# Patient Record
Sex: MALE | Race: WHITE | NOT HISPANIC OR LATINO | Employment: OTHER | URBAN - METROPOLITAN AREA
[De-identification: names, ages, dates, MRNs, and addresses within clinical notes are randomized per-mention and may not be internally consistent; named-entity substitution may affect disease eponyms.]

---

## 2017-03-31 ENCOUNTER — ALLSCRIPTS OFFICE VISIT (OUTPATIENT)
Dept: OTHER | Facility: OTHER | Age: 67
End: 2017-03-31

## 2017-03-31 DIAGNOSIS — Z11.59 ENCOUNTER FOR SCREENING FOR OTHER VIRAL DISEASES: ICD-10-CM

## 2017-03-31 DIAGNOSIS — I51.7 CARDIOMEGALY: ICD-10-CM

## 2017-03-31 DIAGNOSIS — I71.4 ABDOMINAL AORTIC ANEURYSM WITHOUT RUPTURE (HCC): ICD-10-CM

## 2017-03-31 DIAGNOSIS — Z12.5 ENCOUNTER FOR SCREENING FOR MALIGNANT NEOPLASM OF PROSTATE: ICD-10-CM

## 2017-03-31 DIAGNOSIS — I10 ESSENTIAL (PRIMARY) HYPERTENSION: ICD-10-CM

## 2017-03-31 DIAGNOSIS — Z13.6 ENCOUNTER FOR SCREENING FOR CARDIOVASCULAR DISORDERS: ICD-10-CM

## 2017-03-31 DIAGNOSIS — E04.1 NONTOXIC SINGLE THYROID NODULE: ICD-10-CM

## 2017-03-31 DIAGNOSIS — E78.2 MIXED HYPERLIPIDEMIA: ICD-10-CM

## 2017-06-04 ENCOUNTER — LAB CONVERSION - ENCOUNTER (OUTPATIENT)
Dept: OTHER | Facility: OTHER | Age: 67
End: 2017-06-04

## 2017-06-04 LAB
A/G RATIO (HISTORICAL): 1.3 (CALC) (ref 1–2.5)
ALBUMIN SERPL BCP-MCNC: 4.1 G/DL (ref 3.6–5.1)
ALP SERPL-CCNC: 52 U/L (ref 40–115)
ALT SERPL W P-5'-P-CCNC: 15 U/L (ref 9–46)
AST SERPL W P-5'-P-CCNC: 18 U/L (ref 10–35)
BASOPHILS # BLD AUTO: 0.5 %
BASOPHILS # BLD AUTO: 43 CELLS/UL (ref 0–200)
BILIRUB SERPL-MCNC: 0.6 MG/DL (ref 0.2–1.2)
BUN SERPL-MCNC: 18 MG/DL (ref 7–25)
BUN/CREA RATIO (HISTORICAL): NORMAL (CALC) (ref 6–22)
CALCIUM SERPL-MCNC: 9.2 MG/DL (ref 8.6–10.3)
CHLORIDE SERPL-SCNC: 103 MMOL/L (ref 98–110)
CHOLEST SERPL-MCNC: 148 MG/DL (ref 125–200)
CHOLEST/HDLC SERPL: 5.3 (CALC)
CO2 SERPL-SCNC: 28 MMOL/L (ref 20–31)
CREAT SERPL-MCNC: 1.11 MG/DL (ref 0.7–1.25)
DEPRECATED RDW RBC AUTO: 13.8 % (ref 11–15)
EGFR AFRICAN AMERICAN (HISTORICAL): 80 ML/MIN/1.73M2
EGFR-AMERICAN CALC (HISTORICAL): 69 ML/MIN/1.73M2
EOSINOPHIL # BLD AUTO: 292 CELLS/UL (ref 15–500)
EOSINOPHIL # BLD AUTO: 3.4 %
GAMMA GLOBULIN (HISTORICAL): 3.1 G/DL (CALC) (ref 1.9–3.7)
GLUCOSE (HISTORICAL): 82 MG/DL (ref 65–99)
HCT VFR BLD AUTO: 43.2 % (ref 38.5–50)
HDLC SERPL-MCNC: 28 MG/DL
HGB BLD-MCNC: 14.4 G/DL (ref 13.2–17.1)
LDL CHOLESTEROL (HISTORICAL): 95 MG/DL (CALC)
LYMPHOCYTES # BLD AUTO: 2339 CELLS/UL (ref 850–3900)
LYMPHOCYTES # BLD AUTO: 27.2 %
MCH RBC QN AUTO: 29.7 PG (ref 27–33)
MCHC RBC AUTO-ENTMCNC: 33.4 G/DL (ref 32–36)
MCV RBC AUTO: 88.8 FL (ref 80–100)
MONOCYTES # BLD AUTO: 765 CELLS/UL (ref 200–950)
MONOCYTES (HISTORICAL): 8.9 %
NEUTROPHILS # BLD AUTO: 5160 CELLS/UL (ref 1500–7800)
NEUTROPHILS # BLD AUTO: 60 %
NON-HDL-CHOL (CHOL-HDL) (HISTORICAL): 120 MG/DL (CALC)
PLATELET # BLD AUTO: 252 THOUSAND/UL (ref 140–400)
PMV BLD AUTO: 7.9 FL (ref 7.5–12.5)
POTASSIUM SERPL-SCNC: 3.8 MMOL/L (ref 3.5–5.3)
PROSTATE SPECIFIC ANTIGEN TOTAL (HISTORICAL): 0.9 NG/ML
RBC # BLD AUTO: 4.86 MILLION/UL (ref 4.2–5.8)
SODIUM SERPL-SCNC: 139 MMOL/L (ref 135–146)
TESTOSTERONE TOTAL (HISTORICAL): 322 NG/DL (ref 250–827)
TOTAL PROTEIN (HISTORICAL): 7.2 G/DL (ref 6.1–8.1)
TRIGL SERPL-MCNC: 126 MG/DL
TSH SERPL DL<=0.05 MIU/L-ACNC: 1.38 MIU/L (ref 0.4–4.5)
WBC # BLD AUTO: 8.6 THOUSAND/UL (ref 3.8–10.8)

## 2017-06-05 ENCOUNTER — GENERIC CONVERSION - ENCOUNTER (OUTPATIENT)
Dept: OTHER | Facility: OTHER | Age: 67
End: 2017-06-05

## 2017-07-28 ENCOUNTER — ALLSCRIPTS OFFICE VISIT (OUTPATIENT)
Dept: OTHER | Facility: OTHER | Age: 67
End: 2017-07-28

## 2017-07-28 DIAGNOSIS — Z00.00 ENCOUNTER FOR GENERAL ADULT MEDICAL EXAMINATION WITHOUT ABNORMAL FINDINGS: ICD-10-CM

## 2017-07-28 DIAGNOSIS — I10 ESSENTIAL (PRIMARY) HYPERTENSION: ICD-10-CM

## 2017-07-28 DIAGNOSIS — E78.2 MIXED HYPERLIPIDEMIA: ICD-10-CM

## 2017-07-28 LAB — OCCULT BLD, FECAL IMMUNOLOGICAL (HISTORICAL): NEGATIVE

## 2017-09-25 ENCOUNTER — GENERIC CONVERSION - ENCOUNTER (OUTPATIENT)
Dept: OTHER | Facility: OTHER | Age: 67
End: 2017-09-25

## 2017-09-25 LAB
HEPATITIS C ANTIBODY (HISTORICAL): NORMAL
SIGNAL TO CUT-OFF (HISTORICAL): 0.14

## 2017-10-13 ENCOUNTER — HOSPITAL ENCOUNTER (OUTPATIENT)
Dept: CT IMAGING | Facility: HOSPITAL | Age: 67
Discharge: HOME | End: 2017-10-13
Payer: COMMERCIAL

## 2017-10-13 DIAGNOSIS — I10 ESSENTIAL (PRIMARY) HYPERTENSION: ICD-10-CM

## 2017-10-13 DIAGNOSIS — E78.2 MIXED HYPERLIPIDEMIA: ICD-10-CM

## 2017-10-13 DIAGNOSIS — Z00.00 ENCOUNTER FOR GENERAL ADULT MEDICAL EXAMINATION WITHOUT ABNORMAL FINDINGS: ICD-10-CM

## 2017-10-26 ENCOUNTER — GENERIC CONVERSION - ENCOUNTER (OUTPATIENT)
Dept: OTHER | Facility: OTHER | Age: 67
End: 2017-10-26

## 2017-12-08 LAB
A/G RATIO (HISTORICAL): 1.5 (CALC) (ref 1–2.5)
ALBUMIN SERPL BCP-MCNC: 4 G/DL (ref 3.6–5.1)
ALP SERPL-CCNC: 47 U/L (ref 40–115)
ALT SERPL W P-5'-P-CCNC: 18 U/L (ref 9–46)
AST SERPL W P-5'-P-CCNC: 22 U/L (ref 10–35)
BILIRUB SERPL-MCNC: 0.6 MG/DL (ref 0.2–1.2)
BUN SERPL-MCNC: 18 MG/DL (ref 7–25)
BUN/CREA RATIO (HISTORICAL): NORMAL (CALC) (ref 6–22)
CALCIUM SERPL-MCNC: 9.1 MG/DL (ref 8.6–10.3)
CHLORIDE SERPL-SCNC: 106 MMOL/L (ref 98–110)
CHOLEST SERPL-MCNC: 159 MG/DL
CHOLEST/HDLC SERPL: 5 (CALC)
CO2 SERPL-SCNC: 29 MMOL/L (ref 20–31)
CREAT SERPL-MCNC: 1.13 MG/DL (ref 0.7–1.25)
EGFR AFRICAN AMERICAN (HISTORICAL): 79 ML/MIN/1.73M2
EGFR-AMERICAN CALC (HISTORICAL): 68 ML/MIN/1.73M2
GAMMA GLOBULIN (HISTORICAL): 2.6 G/DL (CALC) (ref 1.9–3.7)
GLUCOSE (HISTORICAL): 79 MG/DL (ref 65–99)
HDLC SERPL-MCNC: 32 MG/DL
LDL CHOLESTEROL (HISTORICAL): 107 MG/DL (CALC)
NON-HDL-CHOL (CHOL-HDL) (HISTORICAL): 127 MG/DL (CALC)
POTASSIUM SERPL-SCNC: 3.8 MMOL/L (ref 3.5–5.3)
SODIUM SERPL-SCNC: 142 MMOL/L (ref 135–146)
TOTAL PROTEIN (HISTORICAL): 6.6 G/DL (ref 6.1–8.1)
TRIGL SERPL-MCNC: 104 MG/DL

## 2017-12-09 ENCOUNTER — GENERIC CONVERSION - ENCOUNTER (OUTPATIENT)
Dept: OTHER | Facility: OTHER | Age: 67
End: 2017-12-09

## 2017-12-12 ENCOUNTER — ALLSCRIPTS OFFICE VISIT (OUTPATIENT)
Dept: OTHER | Facility: OTHER | Age: 67
End: 2017-12-12

## 2017-12-13 NOTE — PROGRESS NOTES
Assessment  Assessed    1  Aneurysm, ascending aorta (441 2) (I71 2)   2  Benign essential hypertension (401 1) (I10)   3  LAFB (left anterior fascicular block) (426 2) (I44 4)   4  Left ventricular hypertrophy (429 3) (I51 7)   5  Mixed hyperlipidemia (272 2) (E78 2)   6  Family history of myocardial infarction (V17 3) (Z82 49) : Mother    Plan  Aneurysm, ascending aorta    · Aspirin EC 81 MG Oral Tablet Delayed Release; take 1 tablet by mouth every day   Rx By: Cassidy Driver; Dispense: 90 Days ; #:90 Tablet Delayed Release; Refill: 3;Aneurysm, ascending aorta; KATLYN = N; Sent To: [facility]/PHARMACY #[id number]  · Metoprolol Succinate ER 25 MG Oral Tablet Extended Release 24 Hour; Take 1tablet daily   Rx By: Cassidy Driver; Dispense: 0 Days ; #:30 Tablet Extended Release 24 Hour; Refill: 3;For: Aneurysm, ascending aorta; KATLYN = N; Sent To: [facility]/PHARMACY #6851  · Pravastatin Sodium 10 MG Oral Tablet; TAKE 1 TABLET DAILY AS DIRECTED   Rx By: Cassidy Driver; Dispense: 90 Days ; #:90 Tablet; Refill: 3;For: Aneurysm, ascending aorta; KATLYN = N; Sent To: [facility]/PHARMACY #[id number]  · (1) COMPREHENSIVE METABOLIC PANEL; Status:Active; Requested for:12Apr2018; Perform:[facility]; Due:12Apr2019;Ordered; For:Aneurysm, ascending aorta; Ordered By:Vidya Rowley;   · (1) LIPID PANEL, FASTING; Status:Active; Requested for:12Apr2018; Perform:[facility]; Due:12Apr2019;Ordered;ascending aorta; Ordered By:Vidya Rowley;   · CTA CHEST WO W CONTRAST; Status:Need Information - Financial Authorization; Requested for:12Apr2018; Perform:[facility]; DJQ:[id number];IUIVSIR;HHBUPLCPE aorta; Ordered By:Vidya Rowley;   · Follow-up visit in 4 Months Evaluation and Treatment  Follow-up  Status: Hold For -Scheduling  Requested for: 31Jic5535   Ordered; Aneurysm, ascending aorta; Ordered By: Cassidy Driver Performed:  Due: 22KUQ7361   · A diet that is low in fat, cholesterol, and sodium is considered a cardiac diet ;Status:Complete;   Done: 12RQU5075 06:06PM   Ordered;ascending aorta; Ordered By:Vidya Rowley;   · Begin or continue regular aerobic exercise  Gradually work up to at least {count1}sessions of {dur1} of exercise a week ; Status:Complete;   Done: 66HFF1869 06:06PM   Ordered; For:Aneurysm, ascending aorta; Ordered By:Vidya Rowley;  LAFB (left anterior fascicular block)    · EKG/ECG- POC; Status:Complete;   Done: 29LMG8883 05:41PM   Perform: In Office; Last Updated Rigo Trimble; 12/12/2017 5:42:05 PM;Ordered;(left anterior fascicular block); Ordered By:Vidya Rowley; Discussion/Summary  Cardiology Discussion Summary Free Text Note Form [facility]:   Fusiform Ascending Aortic Aneurysm: measured at 4 4cm on Coronary CT in Oct 2017  Would continue to follow this for now, repeat testing in 6 months 4/2018 to assess for growth  Based on the fact that he has this, along with the LM CCS of 40, he would benefit from being on a baby ASA, as well as a B-blocker, and a statin to reduce cardiac risk and optimize medical therapy  not optimally controlled on current regimen, so I would add a B-blocker, which can reduce the shearing forces on his aneurysm  as noted above, would initiate a statin  Goal LDL would be <100, but in addition, we would use the statin for properties outside of cholesterol lowering, namely reduction of inflammation  Counseling Documentation With Imm: The patient was counseled regarding diagnostic results,-- instructions for management,-- risk factor reductions,-- impressions  total time of encounter was 40 minutes-- and-- 21 minutes was spent counseling  Chief Complaint  Chief Complaint Free Text Note Form: F/U to testing pt req      History of Present Illness  Cardiology (Follow-Up):  The patient states he has been generally Patient is here for evaluation and management of coronary CT that revealed a CCS of 41 in LM and a fusiform aortic aneurysm of 4 4cm , but doing well since the last visit  Comorbid Illnesses: hypertension,-- hyperlipidemia-- and-- Fusiform ascending aortic aneurysm 4 4cm in Oct 2017; LVH; LAHB  Interval Events: feels well, no complaints  Symptoms: denies chest pain at rest,-- denies exertional chest pain,-- denies dyspnea,-- denies fatigue,-- denies exercise intolerance,-- denies palpitations,-- denies edema,-- denies orthopnea,-- denies claudication,-- denies dizziness-- and-- denies orthostatic dizziness  Associated symptoms: no syncope,-- no PND,-- no tendency for easy bleeding,-- no tendency for easy bruising,-- no TIA symptoms-- and-- no recent weight gain  Disease Monitoring:  Medications: the patient is adherent with his medication regimen  -- He denies medication side effects  Review of Systems  Cardiology Male ROS:    Cardiac: No complaints of chest pain, no palpitations, no fainiting  Skin: No complaints of nonhealing sores or skin rash  Genitourinary: No complaints of recurrent urinary tract infections, frequent urination at night, difficult urination, blood in urine, kidney stones, loss of bladder control, no kidney or prostate problems, no erectile dysfunction  Psychological: No complaints of feeling depressed, anxiety, panic attacks, or difficulty concentrating  General: No complaints of trouble sleeping, lack of energy, fatigue, appetite changes, weight changes, fever, frequent infections, or night sweats  Respiratory: No complaints of shortness of breath, cough with sputum, or wheezing  HEENT: No complaints of serious problems, hearing problems, nose problems, throat problems, or snoring  Gastrointestinal: No complaints of liver problems, nausea, vomiting, heartburn, constipation, bloody stools, diarrhea, problems swallowing, adbominal pain, or rectal bleeding  Hematologic: No complaints of bleeding disorders, anemia, blood clots, or excessive brusing    Neurological: No complaints of numbness, tingling, dizziness, weakness, seizures, headaches, syncope or fainting, AM fatigue, daytime sleepiness, no witnessed apnea episodes  Musculoskeletal: arthritis-- and-- swelling/pain, but-- no back pain   ROS Reviewed:   ROS reviewed  Active Problems  Problems    1  Aneurysm of abdominal aorta (441 4) (I71 4)   2  Aneurysm, ascending aorta (441 2) (I71 2)   3  Benign essential hypertension (401 1) (I10)   4  Encounter for screening for cardiovascular disorders (V81 2) (Z13 6)   5  Encounter for screening for malignant neoplasm of colon (V76 51) (Z12 11)   6  Encounter for screening for malignant neoplasm of prostate (V76 44) (Z12 5)   7  Erectile dysfunction, unspecified erectile dysfunction type (607 84) (N52 9)   8  Hemorrhoids (455 6) (K64 9)   9  LAFB (left anterior fascicular block) (426 2) (I44 4)   10  Left ventricular hypertrophy (429 3) (I51 7)   11  Mixed hyperlipidemia (272 2) (E78 2)   12  Need for hepatitis C screening test (V73 89) (Z11 59)   13  Need for vaccination (V05 9) (Z23)   14  Screening for genitourinary condition (V81 6) (Z13 89)   15  Thyroid nodule (241 0) (E04 1)    Past Medical History  Problems    1  History of Acute upper respiratory infection (465 9) (J06 9)   2  History of Acute upper respiratory infection (465 9) (J06 9)   3  History of Elbow injury (959 3) (S59 909A)   4  History of Fecal occult blood test positive (792 1) (R19 5)   5  History of abdominal aortic aneurysm (V12 59) (Z86 79)   6  History of acute sinusitis (V12 69) (Z87 09)   7  History of allergic rhinitis (V12 69) (Z87 09)   8  History of allergic rhinitis (V12 69) (Z87 09)   9  History of hypertension (V12 59) (Z86 79)   10  History of mixed hyperlipidemia (V12 29) (Z86 39)   11  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)   12  History of Olecranon bursitis of right elbow (726 33) (M70 21)  Active Problems And Past Medical History Reviewed: The active problems and past medical history were reviewed and updated today        Surgical History  Problems    1  History of Knee Arthroscopy (Therapeutic)  Surgical History Reviewed: The surgical history was reviewed and updated today  Family History  Mother    1  Family history of myocardial infarction (V17 3) (Z82 49)   2  Family history of sudden cardiac death (SCD) (V17 41) (Z82 41)  Father    3  Family history of cerebrovascular accident (V17 1) (Z82 3)  Family History    4  Family history of Acute Myocardial Infarction (V17 3)   5  Family history of Aneurysm Of Abdominal Aorta   6  Family history of Cancer   7  Family history of Colon Cancer (V16 0)   8  Family history of Gastric Cancer (V16 0)   9  Family history of Liver Cancer   10  Family history of Lung Cancer (V16 1)   11  Family history of Prostate Cancer (V16 42)   12  Family history of Stroke Syndrome (V17 1)  Family History Reviewed: The family history was reviewed and updated today  Social History  Problems    · History of Current some day smoker (305 1) (F17 200)   · Former smoker (I92 99) (A06 903)   ·   Social History Reviewed: The social history was reviewed and updated today  The social history was reviewed and is unchanged  Current Meds   1  Amlodipine Besy-Benazepril HCl - 5-20 MG Oral Capsule; take 1 capsule daily; Therapy: 58MYG6295 to (Last Rx:13Nov2017)  Requested for: 54HUU2243 Ordered   2  Cetirizine HCl TABS; TAKE 1 TABLET DAILY; Therapy: (Recorded:25Idg5698) to Recorded   3  Cialis 20 MG Oral Tablet; 1 tab PO PRN  not to repeat in 72 hrs medically necessary; Therapy: 26Apr2016 to (Evaluate:03Mzs3636); Last Rx:26Apr2016 Ordered   4  Co Q 10 CAPS; TAKE 1 CAPSULE TWICE DAILY; Therapy: (Recorded:40Xbp4450) to Recorded   5  HydroCHLOROthiazide 12 5 MG Oral Tablet; Take 1 tablet daily; Therapy: 84Sqs3831 to (Last Rx:52Rpk3229)  Requested for: 66YJZ4443 Ordered   6  Omega-3-acid Ethyl Esters 1 GM Oral Capsule; take 1 capsule twice a day;  Therapy: 07ESY8964 to (Last Rx:06Jan2016)  Requested for: 57CUN0479 Ordered   7  Red Yeast Rice CAPS; Therapy: (Recorded:58Wkq0973) to Recorded  Medication List Reviewed: The medication list was reviewed and updated today  Allergies  Medication    1  No Known Drug Allergies    Vitals  Vital Signs    Recorded: 66Iyz1855 05:42PM   Heart Rate 68   Respiration 16   Systolic 872, RUE, Sitting   Diastolic 92, RUE, Sitting   BP CUFF SIZE Large   Height 6 ft 7 in   Weight 232 lb 6 oz   BMI Calculated 26 18   BSA Calculated 2 43       Physical Exam   Constitutional - General appearance: No acute distress, well appearing and well nourished  Eyes - Conjunctiva and Sclera examination: Conjunctiva pink, sclera anicteric  Neck - Normal, no JVD   Pulmonary - Respiratory effort: No signs of respiratory distress  -- Auscultation of lungs: Clear to auscultation  Cardiovascular - Auscultation of heart: Normal rate and rhythm, normal S1 and S2, no murmurs  -- Pedal pulses: Normal, 2+ bilaterally  -- Examination of extremities for edema and/or varicosities: Normal    Abdomen - Soft  Musculoskeletal - Gait and station: Normal gait  Skin - Skin: Normal without rashes  Skin is warm and well perfused  Neurologic - Speech normal  No focal deficits  Psychiatric - Orientation to person, place, and time: Normal       Results/Data  ECG Report:  Rhythm and rate:  normal sinus rhythm  P-waves:   the P wave is normal -- NM interval is normal   QRS: left anterior hemiblock  ST segment: the ST segments are normal   T waves:   normal  (1) LIPID PANEL, FASTING 01CDT4603 08:43AM Joslyn Deleon     Test Name Result Flag Reference   CHOLESTEROL, TOTAL 159 mg/dL  <200   HDL CHOLESTEROL 32 mg/dL L >42   TRIGLICERIDES 853 mg/dL  <150   LDL-CHOLESTEROL 107 mg/dL (calc) H      Reference range: <100   Desirable range <100 mg/dL for patients with CHD or diabetes and <70 mg/dL for diabetic patients with known heart disease     LDL-C is now calculated using the Luis-Ruano  calculation, which is a validated novel method providing  better accuracy than the Friedewald equation in the  estimation of LDL-C  Reese Capps  Rosita Salguero  7132;826(23): 4962-5591  (http://Sun-eee/faq/AMR762)   CHOL/HDLC RATIO 5 0 (calc) H <5 0   NON HDL CHOLESTEROL 127 mg/dL (calc)  <130     For patients with diabetes plus 1 major ASCVD risk  factor, treating to a non-HDL-C goal of <100 mg/dL  (LDL-C of <70 mg/dL) is considered a therapeutic  option  CT CORONARY CALCIUM SCORE 14WJT9851 09:55AM Sonia Sin Order Number: [id number]   - Patient Instructions: To schedule this appointment, please contact Central Scheduling at [phone]  Test Name Result Flag Reference   CT CORONARY CALCIUM SCORE (Report)       CT CORONARY ARTERY CALCIUM SCREENING WITHOUT INTRAVENOUS CONTRAST   INDICATION: Z00 00: Encounter for general adult medical examination without abnormal findings  I10: Essential (primary) hypertension  E78 2: Mixed hyperlipidemia (this clinical history is taken directly from the electronic ordering system)  TECHNIQUE: Low radiation dose computed tomography of the heart was performed with EKG gating, suspended respiration, and without intravenous contrast  This examination, like all CT scans performed in the [facility], was performed   utilizing techniques to minimize radiation dose exposure, including the use of iterative reconstruction and automated exposure control  Postprocessing was performed on a 3-D computer workstation to measure the amount of calcium in the coronary arteries  Imaging was limited to the heart and the immediately adjacent lungs     FINDINGS:    Coronary artery calcium score breakdown is as follows (note: calcified atherosclerotic plaque in the posterior descending artery is included in right coronary artery score for right dominant circulation and left circumflex score for left dominant   circulation):   Left main coronary artery: 41  Left anterior descending coronary artery and diagonal branches: 0  Left circumflex coronary artery and left marginal branches: 0  Right coronary artery and right marginal branches: 0   TOTAL coronary calcium score: 41  Calcium score PERCENTILE of age, race, and gender matched database participants in the Multi-Ethnic Study of Atherosclerosis (DELUNA) trial:  42nd    There is fusiform ascending thoracic aortic aneurysm measuring up to 44 mm in caliber, tapering to 37 mm at the level of the proximal aortic arch  There is fatty density within the anterior myocardial wall of the right ventricle seen on image 89 of   series 2  This finding can be seen in patients who are asymptomatic but can also be seen in patients with arrythmogenic right ventricular cardiomyopathy  Emphysematous changes are noted in the lungs  IMPRESSION:   Total cardiac score equals 41  For more useful information regarding the prognostic significance of the calcium score, please consult the calculator at the website https://www [employer]/  aspx  Of note is that all of the calcific   atherosclerotic plaque identified on this examination is located in the left main coronary artery  Fusiform ascending thoracic aortic aneurysm, 44 mm in greatest caliber  Fatty density within the anterior myocardial wall of the right ventricle; this finding can be seen in patients who are asymptomatic but can also be seen in patients with arrythmogenic right ventricular cardiomyopathy       ##sigslh##sigslh       Workstation performed: [id number]   Signed by:  Chauncey Cee MD  10/13/17       Signatures   Electronically signed by : JANA White ; Dec 12 2017  6:27PM EST                       (Author)

## 2018-01-09 NOTE — RESULT NOTES
Discussion/Summary   hep c screening negative     Verified Results  (Q) HEPATITIS C ANTIBODY 64Gjk7596 10:32AM Germaine Shaver   REPORT COMMENT:  FASTING:NO     Test Name Result Flag Reference   HEPATITIS C ANTIBODY NON-REACTIVE  NON-REACTIVE   SIGNAL TO CUT-OFF 0 14  <1 00

## 2018-01-10 NOTE — PROGRESS NOTES
Assessment    1  Encounter for preventive health examination (V70 0) (Z00 00)   2  Benign essential hypertension (401 1) (I10)   3  Mixed hyperlipidemia (272 2) (E78 2)   4  Erectile dysfunction, unspecified erectile dysfunction type (607 84) (N52 9)    Plan  Benign essential hypertension    · Amlodipine Besy-Benazepril HCl - 5-20 MG Oral Capsule (Lotrel); take 1  capsule daily  Benign essential hypertension, Left ventricular hypertrophy    · Hydrochlorothiazide 12 5 MG Oral Tablet; Take 1 tablet daily  Benign essential hypertension, Mixed hyperlipidemia    · Follow-up visit in 6 months Evaluation and Treatment  Follow-up  Status: Hold For -  Scheduling  Requested for: 26Apr2016   · (1) CBC/PLT/DIFF; Status:Active; Requested for:26Oct2016;    · (1) COMPREHENSIVE METABOLIC PANEL; Status:Active; Requested CDY:[id number];    · (1) LIPID PANEL, FASTING; Status:Active; Requested for:26Oct2016;    · (1) VITAMIN D 125-DIHYDROXY (CALCITRIOL); Status:Active; Requested  for:26Oct2016;    · (1) VITAMIN D 25-HYDROXY; Status:Active; Requested ZYE:[id number];   Erectile dysfunction, unspecified erectile dysfunction type    · Cialis 20 MG Oral Tablet; 1 tab PO PRN  not to repeat in 72 hrs medically  necessary  Health Maintenance    · DIGITAL RECTAL EXAM; Status:Complete;   Done: 26Apr2016 04:43PM   · Hemoccult Screening - POC; Status:Complete;   Done: 10WBK8216 04:43PM   · Call [phone] if: You have any warning signs of skin cancer ; Status:Complete;    Done: 78VYS3501 04:58PM   · Call 911 if:  You experience a new kind of chest pain (angina) or pressure ;  Status:Complete;   Done: 80KEU8654 04:58PM   · Begin a limited exercise program ; Status:Complete;   Done: 26Apr2016 04:58PM   · Decreasing the stress in your life may help your condition improve ; Status:Complete;    Done: 26Apr2016 04:58PM   · Diets that are low in carbohydrates and high in protein are very popular for weight loss ;  Status:Complete;   Done: 70DKZ0111 04: 58PM   · Regular aerobic exercise can help reduce stress ; Status:Complete;   Done: 35HHB6406  04:58PM   · The plan of care for falls is detailed in the plan and/or discussion section of today's note ;  Status:Complete;   Done: 26Apr2016 04:58PM   · The plan of care for urinary incontinence is detailed in the plan and/or discussion section  of today's note ; Status:Complete;   Done: 26Apr2016 04:58PM   · There are many exercise options for seniors ; Status:Complete;   Done: 26Apr2016 04:58PM   · There ways to avoid falling ; Status:Complete;   Done: 41Rfb2552 04:58PM   · These are things you can do to prevent falls in and around the home ; Status:Complete;    Done: 26Apr2016 04:58PM  Mixed hyperlipidemia    · Omega-3-acid Ethyl Esters 1 GM Oral Capsule (Lovaza); take 1 capsule twice  a day  Unlinked    · Red Yeast Rice CAPS    Chief Complaint  complete physical exam  Patient was just to the eye dr on Saturday, subsequently vision screen was not completed today  acgRMA      History of Present Illness  HM, Adult Male: The patient is being seen for a health maintenance evaluation  General Health:   Screening:   HPI: as above  Pt is here for a full physical    pt states he feels well  states the thinks he may have little upper respiratory and cough type things    pt states he finds he is having issues with errections    pt would like vit d with his next labs             Review of Systems    Constitutional: No fever or chills, feels well, no tiredness, no recent weight gain or weight loss  Eyes: No complaints of eye pain, no red eyes, no discharge from eyes, no itchy eyes  ENT: no complaints of earache, no hearing loss, no nosebleeds, no nasal discharge, no sore throat, no hoarseness  Cardiovascular: No complaints of slow heart rate, no fast heart rate, no chest pain, no palpitations, no leg claudication, no lower extremity     Respiratory: No complaints of shortness of breath, no wheezing, no cough, no SOB on exertion, no orthopnea or PND  Gastrointestinal: No complaints of abdominal pain, no constipation, no nausea or vomiting, no diarrhea or bloody stools  Genitourinary: No complaints of dysuria, no incontinence, no hesitancy, no nocturia, no genital lesion, no testicular pain  Musculoskeletal: No complaints of arthralgia, no myalgias, no joint swelling or stiffness, no limb pain or swelling  Integumentary: No complaints of skin rash or skin lesions, no itching, no skin wound, no dry skin  Neurological: No compliants of headache, no confusion, no convulsions, no numbness or tingling, no dizziness or fainting, no limb weakness, no difficulty walking  Psychiatric: Is not suicidal, no sleep disturbances, no anxiety or depression, no change in personality, no emotional problems  Endocrine: No complaints of proptosis, no hot flashes, no muscle weakness, no erectile dysfunction, no deepening of the voice, no feelings of weakness  Hematologic/Lymphatic: No complaints of swollen glands, no swollen glands in the neck, does not bleed easily, no easy bruising  Active Problems    1  Aneurysm of abdominal aorta (441 4) (I71 4)   2  Benign essential hypertension (401 1) (I10)   3  Encounter for screening for malignant neoplasm of colon (V76 51) (Z12 11)   4  Hemorrhoids (455 6) (K64 9)   5  LAFB (left anterior fascicular block) (426 2) (I44 4)   6  Left ventricular hypertrophy (429 3) (I51 7)   7  Mixed hyperlipidemia (272 2) (E78 2)   8  Need for vaccination (V05 9) (Z23)   9   Thyroid disorder (246 9) (E07 9)    Past Medical History    · History of Acute upper respiratory infection (465 9) (J06 9)   · History of Acute upper respiratory infection (465 9) (J06 9)   · History of Elbow injury (959 3) (S50 579A)   · History of Fecal occult blood test positive (792 1) (R19 5)   · History of abdominal aortic aneurysm (V12 59) (Z86 79)   · History of acute sinusitis (V12 69) (Z87 09)   · History of allergic rhinitis (V12 69) (Z87 09)   · History of allergic rhinitis (V12 69) (Z87 09)   · History of hypertension (V12 59) (Z86 79)   · History of mixed hyperlipidemia (V12 29) (Z86 39)   · 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)   · History of Olecranon bursitis of right elbow (726 33) (M70 21)    Surgical History    · History of Knee Arthroscopy    Family History  Mother    · Family history of myocardial infarction (V17 3) (Z82 49)   · Family history of sudden cardiac death (SCD) (V17 36) (Z80 37)  Father    · Family history of cerebrovascular accident (V17 1) (Z82 3)  Family History    · Family history of Acute Myocardial Infarction (V17 3)   · Family history of Aneurysm Of Abdominal Aorta   · Family history of Cancer   · Family history of Colon Cancer (V16 0)   · Family history of Gastric Cancer (V16 0)   · Family history of Liver Cancer   · Family history of Lung Cancer (V16 1)   · Family history of Prostate Cancer (V16 42)   · Family history of Stroke Syndrome (V17 1)    Social History    · History of Current some day smoker (305 1) (F17 210)   · Former smoker (V98 38) (Z87 665)   ·     Current Meds   1  Amlodipine Besy-Benazepril HCl - 5-20 MG Oral Capsule; take 1 capsule daily; Therapy: 30HBY8059 to (Last YB:62BBN3315)  Requested for: 44LWF6516 Ordered   2  Cetirizine HCl TABS; Therapy: (Recorded:88Gfa4703) to Recorded   3  Co Q 10 CAPS; Therapy: (Recorded:33Gwv8066) to Recorded   4  Hydrochlorothiazide 12 5 MG Oral Tablet; Take 1 tablet daily; Therapy: 84Obb3077 to (Last Rx:22Apr2016)  Requested for: 09Oev5793 Ordered   5  Omega-3-acid Ethyl Esters 1 GM Oral Capsule; take 1 capsule twice a day; Therapy: 77XJR9524 to (Last Rx:06Jan2016)  Requested for: 11VDO7005 Ordered   6  Red Yeast Rice CAPS; Therapy: (Recorded:51Zti0995) to Recorded    Allergies    1   No Known Drug Allergies    Vitals   Recorded: 26Apr2016 04:22PM   Temperature 97 6 F    Heart Rate 72    Respiration 18    Blood Pressure 76 mm Hg 112 mm Hg   Height 6 ft 7 in    Weight 225 lb     BMI Calculated 25 35 kg/m2    BSA Calculated 2 4 m2      Physical Exam    Constitutional   General appearance: No acute distress, well appearing and well nourished  Eyes   Conjunctiva and lids: No erythema, swelling or discharge  Pupils and irises: Equal, round, reactive to light  Ophthalmoscopic examination: Normal fundi and optic discs  Ears, Nose, Mouth, and Throat   External inspection of ears and nose: Normal     Otoscopic examination: Tympanic membranes translucent with normal light reflex  Canals patent without erythema  Hearing: Normal     Nasal mucosa, septum, and turbinates: Normal without edema or erythema  Lips, teeth, and gums: Normal, good dentition  Oropharynx: Normal with no erythema, edema, exudate or lesions  Neck   Neck: Supple, symmetric, trachea midline, no masses  Thyroid: Normal, no thyromegaly  Pulmonary   Respiratory effort: No increased work of breathing or signs of respiratory distress  Percussion of chest: Normal     Palpation of chest: Normal     Auscultation of lungs: Clear to auscultation  Cardiovascular   Palpation of heart: Normal PMI, no thrills  Auscultation of heart: Normal rate and rhythm, normal S1 and S2, no murmurs  Carotid pulses: 2+ bilaterally  Abdominal aorta: Normal     Femoral pulses: 2+ bilaterally  Pedal pulses: 2+ bilaterally  Examination of extremities for edema and/or varicosities: Normal     Chest   Breasts: Normal, no dimpling or skin changes appreciated  Palpation of breasts and axillae: Normal, no masses palpated  Chest: Normal     Abdomen   Abdomen: Non-tender, no masses  Liver and spleen: No hepatomegaly or splenomegaly  Examination for hernias: No hernias appreciated  Anus, perineum, and rectum: Normal sphincter tone, no masses, no prolapse  Stool sample for occult blood: Negative      Genitourinary   Scrotal contents: Normal testes, no masses  Penis: Normal, no lesions  Digital rectal exam of prostate: Normal size, no masses  Lymphatic   Palpation of lymph nodes in neck: No lymphadenopathy  Palpation of lymph nodes in axillae: No lymphadenopathy  Palpation of lymph nodes in groin: No lymphadenopathy  Palpation of lymph nodes in other areas: No lymphadenopathy  Musculoskeletal   Gait and station: Normal     Inspection/palpation of digits and nails: Normal without clubbing or cyanosis  Inspection/palpation of joints, bones, and muscles: Normal     Range of motion: Normal     Stability: Normal     Muscle strength/tone: Normal     Skin   Skin and subcutaneous tissue: Normal without rashes or lesions  Palpation of skin and subcutaneous tissue: Normal turgor  Neurologic   Cranial nerves: Cranial nerves 2-12 intact  Reflexes: 2+ and symmetric  Sensation: No sensory loss  Psychiatric   Judgment and insight: Normal     Orientation to person, place and time: Normal     Recent and remote memory: Intact      Mood and affect: Normal        Results/Data  Hemoccult Screening - POC 26Apr2016 04:43PM Clarine Cue     Test Name Result Flag Reference   Hemoccult Negative       DIGITAL RECTAL EXAM [id number] 04:43PM [facility] Cue     Test Name Result Flag Reference   DIGITAL RECTAL EXAM [id number]       (Q) CBC (INCLUDES DIFF/PLT) (REFL) [id number] 09:30AM Clarine Cue     Test Name Result Flag Reference   WHITE BLOOD CELL COUNT 7 4 Thousand/uL  3 8-10 8   RED BLOOD CELL COUNT 4 88 Million/uL  4 20-5 80   HEMOGLOBIN 14 5 g/dL  13 2-17 1   HEMATOCRIT 43 6 %  38 5-50 0   MCV 89 3 fL  80 0-100 0   MCH 29 6 pg  27 0-33 0   MCHC 33 2 g/dL  32 0-36 0   RDW 13 7 %  11 0-15 0   PLATELET COUNT 103 Thousand/uL  140-400   MPV 8 4 fL  7 5-11 5   ABSOLUTE NEUTROPHILS 4366 cells/uL  5419-5716   ABSOLUTE LYMPHOCYTES 2020 cells/uL  850-3900   ABSOLUTE MONOCYTES 622 cells/uL  200-950   ABSOLUTE EOSINOPHILS 348 cells/uL   ABSOLUTE BASOPHILS 44 cells/uL  0-200   NEUTROPHILS 59 0 %     LYMPHOCYTES 27 3 %     MONOCYTES 8 4 %     EOSINOPHILS 4 7 %     BASOPHILS 0 6 %       (Q) COMPREHENSIVE METABOLIC PNL W/ADJUSTED CALCIUM [id number] 09:30AM [facility]     Test Name Result Flag Reference   GLUCOSE 77 mg/dL  65-99   Fasting reference interval   UREA NITROGEN (BUN) 18 mg/dL  7-25   CREATININE 1 09 mg/dL  0 70-1 25   For patients >52years of age, the reference limit  for Creatinine is approximately 13% higher for people  identified as -American  eGFR NON-AFR  AMERICAN 72 mL/min/1 73m2  > OR = 60   eGFR AFRICAN AMERICAN 83 mL/min/1 73m2  > OR = 60   BUN/CREATININE RATIO   2-75   NOT APPLICABLE (calc)   SODIUM 141 mmol/L  135-146   POTASSIUM 4 0 mmol/L  3 5-5 3   CHLORIDE 104 mmol/L     CARBON DIOXIDE 25 mmol/L  19-30   CALCIUM 9 3 mg/dL  8 6-10 3   CALCIUM (ADJUSTED FOR$ALBUMIN) 9 4 mg/dL (calc)  8 6-10 2   PROTEIN, TOTAL 7 1 g/dL  6 1-8 1   ALBUMIN 4 2 g/dL  3 6-5 1   GLOBULIN 2 9 g/dL (calc)  1 9-3 7   ALBUMIN/GLOBULIN RATIO 1 4 (calc)  1 0-2 5   BILIRUBIN, TOTAL 0 6 mg/dL  0 2-1 2   ALKALINE PHOSPHATASE 59 U/L     AST 19 U/L  10-35   ALT 14 U/L  9-46     (Q) LIPID PANEL WITH DIRECT LDL [id number] 09:30AM [facility]     Test Name Result Flag Reference   CHOLESTEROL, TOTAL 161 mg/dL  125-200   HDL CHOLESTEROL 33 mg/dL L > OR = 40   TRIGLICERIDES 92 mg/dL  <245   DIRECT  mg/dL  <130   Desirable range <100 mg/dL for patients with CHD or  diabetes and <70 mg/dL for diabetic patients with  known heart disease  CHOL/HDLC RATIO 4 9 (calc)  < OR = 5 0   NON HDL CHOLESTEROL 128 mg/dL (calc)     Target for non-HDL cholesterol is 30 mg/dL higher than   LDL cholesterol target  (Q) PSA, TOTAL WITH REFLEX TO PSA, FREE 21Nov2015 09:30AM [facility]     Test Name Result Flag Reference   TOTAL PSA 1 0 ng/mL  < OR = 4 0   This test was performed using the IntegenX Fco  immunoassay method   Values obtained with other assay  methods cannot be used interchangeably  PSA levels,  regardless of value, should not be interpreted as  absolute evidence of the presence or absence of disease  (Q) TSH, 3RD GENERATION W/REFLEX TO FT4 [id number] 09:30AM [facility]   REPORT COMMENT:  FASTING:YES     Test Name Result Flag Reference   TSH W/REFLEX TO FT4 1 03 mIU/L  0 40-4 50       Procedure    Procedure: Visual Acuity Test   patient saw eye dr on Saturday - corrective lenses  Indication: routine screening  Signatures   Electronically signed by : Ricardo Cadet DO;  Apr 26 2016  5:00PM EST                       (Author)

## 2018-01-11 NOTE — RESULT NOTES
Verified Results  (1) CBC/PLT/DIFF [id number] 08:33AM [facility]   REPORT COMMENT:  FASTING:YES     Test Name Result Flag Reference   WHITE BLOOD CELL COUNT 7 5 Thousand/uL  3 8-10 8   RED BLOOD CELL COUNT 4 84 Million/uL  4 20-5 80   HEMOGLOBIN 14 1 g/dL  13 2-17 1   HEMATOCRIT 42 7 %  38 5-50 0   MCV 88 4 fL  80 0-100 0   MCH 29 1 pg  27 0-33 0   MCHC 33 0 g/dL  32 0-36 0   RDW 14 1 %  11 0-15 0   PLATELET COUNT 637 Thousand/uL  140-400   MPV 8 1 fL  7 5-11 5   ABSOLUTE NEUTROPHILS 4433 cells/uL  2260-4175   ABSOLUTE LYMPHOCYTES 1995 cells/uL  850-3900   ABSOLUTE MONOCYTES 758 cells/uL  200-950   ABSOLUTE EOSINOPHILS 278 cells/uL     ABSOLUTE BASOPHILS 38 cells/uL  0-200   NEUTROPHILS 59 1 %     LYMPHOCYTES 26 6 %     MONOCYTES 10 1 %     EOSINOPHILS 3 7 %     BASOPHILS 0 5 %       (1) COMPREHENSIVE METABOLIC PANEL [id number] 91:14HI [facility]an Musca     Test Name Result Flag Reference   GLUCOSE 79 mg/dL  65-99   Fasting reference interval   UREA NITROGEN (BUN) 18 mg/dL  7-25   CREATININE 1 10 mg/dL  0 70-1 25   For patients >52years of age, the reference limit  for Creatinine is approximately 13% higher for people  identified as -American  eGFR NON-AFR   AMERICAN 71 mL/min/1 73m2  > OR = 60   eGFR AFRICAN AMERICAN 82 mL/min/1 73m2  > OR = 60   BUN/CREATININE RATIO   2-41   NOT APPLICABLE (calc)   SODIUM 142 mmol/L  135-146   POTASSIUM 3 8 mmol/L  3 5-5 3   CHLORIDE 104 mmol/L     CARBON DIOXIDE 26 mmol/L  19-30   CALCIUM 9 2 mg/dL  8 6-10 3   PROTEIN, TOTAL 7 3 g/dL  6 1-8 1   ALBUMIN 4 2 g/dL  3 6-5 1   GLOBULIN 3 1 g/dL (calc)  1 9-3 7   ALBUMIN/GLOBULIN RATIO 1 4 (calc)  1 0-2 5   BILIRUBIN, TOTAL 0 6 mg/dL  0 2-1 2   ALKALINE PHOSPHATASE 57 U/L     AST 22 U/L  10-35   ALT 20 U/L  9-46     (1) LIPID PANEL, FASTING [id number] 08:33AM [facility]     Test Name Result Flag Reference   CHOLESTEROL, TOTAL 172 mg/dL  125-200   HDL CHOLESTEROL 33 mg/dL L > OR = 40   TRIGLICERIDES 89 mg/dL  <150   LDL-CHOLESTEROL 121 mg/dL (calc)  <130   Desirable range <100 mg/dL for patients with CHD or  diabetes and <70 mg/dL for diabetic patients with  known heart disease  CHOL/HDLC RATIO 5 2 (calc) H < OR = 5 0   NON HDL CHOLESTEROL 139 mg/dL (calc)     Target for non-HDL cholesterol is 30 mg/dL higher than   LDL cholesterol target  (Q) VITAMIN D, 25 HYDROXY AND 1,25 DIHYDROXY, LC/MS/MS [id number] 08:33AM [facility]     Test Name Result Flag Reference   VITAMIN D, 25-OH, TOTAL 58 ng/mL     VITAMIN D, 25-OH, D3 58 ng/mL     VITAMIN D, 25-OH, D2 <4 ng/mL     25-OHD3 indicates both endogenous production and  supplementation  25-OHD2 is an indicator of  exogenous sources such as diet or supplementation  Therapy is based on measurement of Total 25-OHD,  with levels <20 ng/mL indicative of Vitamin D  deficiency while levels between 20 ng/mL and 30  ng/mL suggest insufficiency  Optimal levels are  > or = 30 ng/mL  For more information on this test, go to  [email or web address] Notes/faq/NKV359   VITAMIN D, 1,25 (OH)2,$TOTAL 57 pg/mL  18-72   VITAMIN D3, 1,25 (OH)2 57 pg/mL     VITAMIN D2, 1,25 (OH)2 <8 pg/mL     Vitamin D3, 1,25(OH)2 indicates both endogenous  production and supplementation  Vitamin D2, 1,25(OH)2  is an indicator of exogeous sources, such as diet or  supplementation  Interpretation and therapy are based  on measurement of Vitamin D,1,25(OH)2, Total         This test was developed and its analytical  performance characteristics have been determined  by [place], [place]  It has not been cleared or approved by the FDA  This  assay has been validated pursuant to the CLIA  regulations and is used for clinical purposes         Discussion/Summary   labs acceptable

## 2018-01-12 NOTE — RESULT NOTES
Discussion/Summary   called pt to discuss his coronary calcium score - his score is 41 which gives him a 10 year risk of an MI of 11 4 given all his factors  Without that calcium score his risk is higher at 13 9 the the test shows his risk is actually lower than what we would have calculated without it      left message to discuss this with him   if the pt is concerned about his overall cardiac risk we could have him seen by cardio to see if any further reccomendations/interventions would benefit pt  left message for pt to call back     Verified Results  CT CORONARY CALCIUM SCORE [id number] 09:55AM [facility] Order Number: [id number]    - Patient Instructions: To schedule this appointment, please contact Central Scheduling at [phone]  Test Name Result Flag Reference   CT CORONARY CALCIUM SCORE (Report)     CT CORONARY ARTERY CALCIUM SCREENING WITHOUT INTRAVENOUS CONTRAST     INDICATION: Z00 00: Encounter for general adult medical examination without abnormal findings   I10: Essential (primary) hypertension   E78 2: Mixed hyperlipidemia (this clinical history is taken directly from the electronic ordering system)  TECHNIQUE: Low radiation dose computed tomography of the heart was performed with EKG gating, suspended respiration, and without intravenous contrast  This examination, like all CT scans performed in the [facility], was performed    utilizing techniques to minimize radiation dose exposure, including the use of iterative reconstruction and automated exposure control  Postprocessing was performed on a 3-D computer workstation to measure the amount of calcium in the coronary arteries  Imaging was limited to the heart and the immediately adjacent lungs       FINDINGS:      Coronary artery calcium score breakdown is as follows (note: calcified atherosclerotic plaque in the posterior descending artery is included in right coronary artery score for right dominant circulation and left circumflex score for left dominant    circulation):     Left main coronary artery: 41   Left anterior descending coronary artery and diagonal branches: 0   Left circumflex coronary artery and left marginal branches: 0   Right coronary artery and right marginal branches: 0     TOTAL coronary calcium score: 41   Calcium score PERCENTILE of age, race, and gender matched database participants in the Multi-Ethnic Study of Atherosclerosis (DELUNA) trial:  42nd       There is fusiform ascending thoracic aortic aneurysm measuring up to 44 mm in caliber, tapering to 37 mm at the level of the proximal aortic arch  There is fatty density within the anterior myocardial wall of the right ventricle seen on image 89 of    series 2  This finding can be seen in patients who are asymptomatic but can also be seen in patients with arrythmogenic right ventricular cardiomyopathy  Emphysematous changes are noted in the lungs  IMPRESSION:     Total cardiac score equals 41  For more useful information regarding the prognostic significance of the calcium score, please consult the calculator at the website [email or web address] [employer] org/  aspx  Of note is that all of the calcific    atherosclerotic plaque identified on this examination is located in the left main coronary artery  Fusiform ascending thoracic aortic aneurysm, 44 mm in greatest caliber  Fatty density within the anterior myocardial wall of the right ventricle; this finding can be seen in patients who are asymptomatic but can also be seen in patients with arrythmogenic right ventricular cardiomyopathy           ##sigslh##sigslh            Workstation performed: [id number]     Signed by:   Irving Coleman MD   10/13/17

## 2018-01-13 VITALS
HEIGHT: 78 IN | DIASTOLIC BLOOD PRESSURE: 86 MMHG | SYSTOLIC BLOOD PRESSURE: 122 MMHG | HEART RATE: 68 BPM | WEIGHT: 237 LBS | RESPIRATION RATE: 16 BRPM | BODY MASS INDEX: 27.42 KG/M2 | TEMPERATURE: 97.1 F

## 2018-01-16 NOTE — PROGRESS NOTES
Assessment   1  Encounter for preventive health examination (V70 0) (Z00 00)  2  Benign essential hypertension (401 1) (I10)  3  Mixed hyperlipidemia (272 2) (E78 2)  4  Need for hepatitis C screening test (V73 89) (Z11 59)    Plan  Benign essential hypertension    · Continue: Amlodipine Besy-Benazepril HCl - 5-20 MG Oral Capsule (Lotrel); take 1  capsule daily  Benign essential hypertension, Health Maintenance, Mixed hyperlipidemia    · CT CORONARY CALCIUM SCORE; Status:Hold For - Scheduling; Requested  for:95Vap6424;   Benign essential hypertension, Left ventricular hypertrophy    · Continue: HydroCHLOROthiazide 12 5 MG Oral Tablet; Take 1 tablet daily  Benign essential hypertension, Mixed hyperlipidemia    · (1) COMPREHENSIVE METABOLIC PANEL; Status:Active; Requested UOH:[id number];    · (1) LIPID PANEL, FASTING; Status:Active; Requested MKL:[id number];    · Follow-up visit in 6 months Evaluation and Treatment  Follow-up  Status: Hold For -  Scheduling  Requested for: [address] [place] [address],[place] [address] Maintenance    · [address]  [facility] [address] ; Status:Complete;   Done: 10DEW6662 09:30AM   · Hemoccult Screening - POC; Status:Complete;   Done: 48ZDQ2767 09:30AM  Mixed hyperlipidemia    · Continue: Omega-3-acid Ethyl Esters 1 GM Oral Capsule (Lovaza); take 1 capsule twice  a day  Need for hepatitis C screening test    · (Q) HEPATITIS C ANTIBODY; Status:Active; Requested for:73Vyi6253; Unlinked    · Continue: Co Q 10 CAPS; TAKE 1 CAPSULE TWICE DAILY   · Continue: Red Yeast Rice CAPS    Discussion/Summary  health maintenance visit      Chief Complaint  pt present for CPE  af/rma      History of Present Illness  HM, Adult Male: The patient is being seen for a health maintenance evaluation  General Health:   Screening:   HPI: Pt is here for a full physical    pt states he has a list of items  Pt states his wife is concerned about his potassium    Pt states he would like a coronary artery calcification screening   advised this may not be covered    pt would like to be tested for hep c                 Review of Systems    Constitutional: No fever or chills, feels well, no tiredness, no recent weight gain or weight loss  Eyes: No complaints of eye pain, no red eyes, no discharge from eyes, no itchy eyes  ENT: no complaints of earache, no hearing loss, no nosebleeds, no nasal discharge, no sore throat, no hoarseness  Cardiovascular: No complaints of slow heart rate, no fast heart rate, no chest pain, no palpitations, no leg claudication, no lower extremity  Respiratory: No complaints of shortness of breath, no wheezing, no cough, no SOB on exertion, no orthopnea or PND  Gastrointestinal: No complaints of abdominal pain, no constipation, no nausea or vomiting, no diarrhea or bloody stools  Genitourinary: No complaints of dysuria, no incontinence, no hesitancy, no nocturia, no genital lesion, no testicular pain  Musculoskeletal: No complaints of arthralgia, no myalgias, no joint swelling or stiffness, no limb pain or swelling  Integumentary: No complaints of skin rash or skin lesions, no itching, no skin wound, no dry skin  Neurological: No compliants of headache, no confusion, no convulsions, no numbness or tingling, no dizziness or fainting, no limb weakness, no difficulty walking  Psychiatric: Is not suicidal, no sleep disturbances, no anxiety or depression, no change in personality, no emotional problems  Endocrine: No complaints of proptosis, no hot flashes, no muscle weakness, no erectile dysfunction, no deepening of the voice, no feelings of weakness  Hematologic/Lymphatic: No complaints of swollen glands, no swollen glands in the neck, does not bleed easily, no easy bruising  Active Problems   1  Aneurysm of abdominal aorta (441 4) (I71 4)  2  Benign essential hypertension (401 1) (I10)  3  Encounter for screening for cardiovascular disorders (V81 2) (Z13 6)  4   Encounter for screening for malignant neoplasm of colon (V76 51) (Z12 11)  5  Encounter for screening for malignant neoplasm of prostate (V76 44) (Z12 5)  6  Erectile dysfunction, unspecified erectile dysfunction type (607 84) (N52 9)  7  Hemorrhoids (455 6) (K64 9)  8  LAFB (left anterior fascicular block) (426 2) (I44 4)  9  Left ventricular hypertrophy (429 3) (I51 7)  10  Mixed hyperlipidemia (272 2) (E78 2)  11  Need for hepatitis C screening test (V73 89) (Z11 59)  12  Need for vaccination (V05 9) (Z23)  13  Screening for genitourinary condition (V81 6) (Z13 89)  14   Thyroid nodule (241 0) (E04 1)    Past Medical History    · History of Acute upper respiratory infection (465 9) (J06 9)   · History of Acute upper respiratory infection (465 9) (J06 9)   · History of Elbow injury (959 3) (S59 909A)   · History of Fecal occult blood test positive (792 1) (R19 5)   · History of abdominal aortic aneurysm (V12 59) (Z86 79)   · History of acute sinusitis (V12 69) (Z87 09)   · History of allergic rhinitis (V12 69) (Z87 09)   · History of allergic rhinitis (V12 69) (Z87 09)   · History of hypertension (V12 59) (Z86 79)   · History of mixed hyperlipidemia (V12 29) (Z86 39)   · 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)   · History of Olecranon bursitis of right elbow (726 33) (M70 21)    Surgical History    · History of Knee Arthroscopy (Therapeutic)    Family History  Mother    · Family history of myocardial infarction (V17 3) (Z82 49)   · Family history of sudden cardiac death (SCD) (V17 41) (Z80 37)  Father    · Family history of cerebrovascular accident (V17 1) (Z82 3)  Family History    · Family history of Acute Myocardial Infarction (V17 3)   · Family history of Aneurysm Of Abdominal Aorta   · Family history of Cancer   · Family history of Colon Cancer (V16 0)   · Family history of Gastric Cancer (V16 0)   · Family history of Liver Cancer   · Family history of Lung Cancer (V16 1)   · Family history of Prostate Cancer (V16 42)   · Family history of Stroke Syndrome (V17 1)    Social History    · History of Current some day smoker (305 1) (F17 200)   · Former smoker (S94 13) (W13 076)   ·     Current Meds  1  Amlodipine Besy-Benazepril HCl - 5-20 MG Oral Capsule; take 1 capsule daily; Therapy: 38VGK6751 to (Last Rx:31Mar2017)  Requested for: 65YRH2585 Ordered  2  Cetirizine HCl TABS; TAKE 1 TABLET DAILY; Therapy: (Recorded:11Esg9754) to Recorded  3  Cialis 20 MG Oral Tablet; 1 tab PO PRN  not to repeat in 72 hrs medically necessary; Therapy: 26Apr2016 to (Evaluate:79Sib3318); Last Rx:26Apr2016 Ordered  4  Co Q 10 CAPS; TAKE 1 CAPSULE TWICE DAILY; Therapy: (Recorded:28Jul2017) to Recorded  5  HydroCHLOROthiazide 12 5 MG Oral Tablet; Take 1 tablet daily; Therapy: 82Hfv8196 to (Last Rx:01Apr2017)  Requested for: 01Apr2017 Ordered  6  Omega-3-acid Ethyl Esters 1 GM Oral Capsule; take 1 capsule twice a day; Therapy: 40OFY2444 to (Last Rx:06Jan2016)  Requested for: 26Apr2016 Ordered  7  Red Yeast Rice CAPS; Therapy: (Recorded:96Fas0529) to Recorded    Allergies   1  No Known Drug Allergies    Vitals   Recorded: 28Jul2017 09:11AM   Temperature 96 8 F   Heart Rate 70   Respiration 16   Systolic 698   Diastolic 84   Height 6 ft 7 in   Weight 235 lb    BMI Calculated 26 47   BSA Calculated 2 44     Physical Exam    Constitutional   General appearance: No acute distress, well appearing and well nourished  Eyes   Conjunctiva and lids: No erythema, swelling or discharge  Pupils and irises: Equal, round, reactive to light  Ophthalmoscopic examination: Normal fundi and optic discs  Ears, Nose, Mouth, and Throat   External inspection of ears and nose: Normal     Otoscopic examination: Tympanic membranes translucent with normal light reflex  Canals patent without erythema  Hearing: Normal     Nasal mucosa, septum, and turbinates: Normal without edema or erythema  Lips, teeth, and gums: Normal, good dentition      Oropharynx: Normal with no erythema, edema, exudate or lesions  Neck   Neck: Supple, symmetric, trachea midline, no masses  Thyroid: Normal, no thyromegaly  Pulmonary   Respiratory effort: No increased work of breathing or signs of respiratory distress  Percussion of chest: Normal     Palpation of chest: Normal     Auscultation of lungs: Clear to auscultation  Cardiovascular   Palpation of heart: Normal PMI, no thrills  Auscultation of heart: Normal rate and rhythm, normal S1 and S2, no murmurs  Carotid pulses: 2+ bilaterally  Abdominal aorta: Normal     Femoral pulses: 2+ bilaterally  Pedal pulses: 2+ bilaterally  Examination of extremities for edema and/or varicosities: Normal     Chest   Breasts: Normal, no dimpling or skin changes appreciated  Palpation of breasts and axillae: Normal, no masses palpated  Chest: Normal     Abdomen   Abdomen: Non-tender, no masses  Liver and spleen: No hepatomegaly or splenomegaly  Examination for hernias: No hernias appreciated  Anus, perineum, and rectum: Normal sphincter tone, no masses, no prolapse  Stool sample for occult blood: Negative  Genitourinary   Scrotal contents: Normal testes, no masses  Penis: Normal, no lesions  Digital rectal exam of prostate: Normal size, no masses  Lymphatic   Palpation of lymph nodes in neck: No lymphadenopathy  Palpation of lymph nodes in axillae: No lymphadenopathy  Palpation of lymph nodes in groin: No lymphadenopathy  Palpation of lymph nodes in other areas: No lymphadenopathy  Musculoskeletal   Gait and station: Normal     Inspection/palpation of digits and nails: Normal without clubbing or cyanosis  Inspection/palpation of joints, bones, and muscles: Normal     Range of motion: Normal     Stability: Normal     Muscle strength/tone: Normal     Skin   Skin and subcutaneous tissue: Normal without rashes or lesions  Palpation of skin and subcutaneous tissue: Normal turgor      Neurologic Cranial nerves: Cranial nerves 2-12 intact  Reflexes: 2+ and symmetric  Sensation: No sensory loss  Psychiatric   Judgment and insight: Normal     Orientation to person, place and time: Normal     Recent and remote memory: Intact  Mood and affect: Normal        Results/Data  Hemoccult Screening - POC [id number] 09:30AM Josh Garcia     Test Name Result Flag Reference   Hemoccult Negative       DIGITAL RECTAL EXAM [id number] 09:30AM Josh Garcia     Test Name Result Flag Reference   DIGITAL RECTAL EXAM [id number]       (1) CBC/PLT/DIFF [id number] 08:28AM Josh Garcia     Test Name Result Flag Reference   WHITE BLOOD CELL COUNT 8 6 Thousand/uL  3 8-10 8   RED BLOOD CELL COUNT 4 86 Million/uL  4 20-5 80   HEMOGLOBIN 14 4 g/dL  13 2-17 1   HEMATOCRIT 43 2 %  38 5-50 0   MCV 88 8 fL  80 0-100 0   MCH 29 7 pg  27 0-33 0   MCHC 33 4 g/dL  32 0-36 0   RDW 13 8 %  11 0-15 0   PLATELET COUNT 379 Thousand/uL  140-400   ABSOLUTE NEUTROPHILS 5160 cells/uL  6227-9145   ABSOLUTE LYMPHOCYTES 2339 cells/uL  850-3900   ABSOLUTE MONOCYTES 765 cells/uL  200-950   ABSOLUTE EOSINOPHILS 292 cells/uL     ABSOLUTE BASOPHILS 43 cells/uL  0-200   NEUTROPHILS 60 0 %     LYMPHOCYTES 27 2 %     MONOCYTES 8 9 %     EOSINOPHILS 3 4 %     BASOPHILS 0 5 %     MPV 7 9 fL  7 5-12 5     (1) COMPREHENSIVE METABOLIC PANEL [id number] 78:99WK Josh Garcia     Test Name Result Flag Reference   GLUCOSE 82 mg/dL  65-99   Fasting reference interval   UREA NITROGEN (BUN) 18 mg/dL  7-25   CREATININE 1 11 mg/dL  0 70-1 25   For patients >52years of age, the reference limit  for Creatinine is approximately 13% higher for people  identified as -American  eGFR NON-AFR   AMERICAN 69 mL/min/1 73m2  > OR = 60   eGFR AFRICAN AMERICAN 80 mL/min/1 73m2  > OR = 60   BUN/CREATININE RATIO   9-87   NOT APPLICABLE (calc)   SODIUM 139 mmol/L  135-146   POTASSIUM 3 8 mmol/L  3 5-5 3   CHLORIDE 103 mmol/L     CARBON DIOXIDE 28 mmol/L 20-31   CALCIUM 9 2 mg/dL  8 6-10 3   PROTEIN, TOTAL 7 2 g/dL  6 1-8 1   ALBUMIN 4 1 g/dL  3 6-5 1   GLOBULIN 3 1 g/dL (calc)  1 9-3 7   ALBUMIN/GLOBULIN RATIO 1 3 (calc)  1 0-2 5   BILIRUBIN, TOTAL 0 6 mg/dL  0 2-1 2   ALKALINE PHOSPHATASE 52 U/L     AST 18 U/L  10-35   ALT 15 U/L  9-46     (1) LIPID PANEL, FASTING [id number] 08:28AM [employer]     Test Name Result Flag Reference   CHOLESTEROL, TOTAL 148 mg/dL  125-200   HDL CHOLESTEROL 28 mg/dL L > OR = 40   TRIGLICERIDES 170 mg/dL  <150   LDL-CHOLESTEROL 95 mg/dL (calc)  <130   Desirable range <100 mg/dL for patients with CHD or  diabetes and <70 mg/dL for diabetic patients with  known heart disease  CHOL/HDLC RATIO 5 3 (calc) H < OR = 5 0   NON HDL CHOLESTEROL 120 mg/dL (calc)     Target for non-HDL cholesterol is 30 mg/dL higher than   LDL cholesterol target  (1) PSA (SCREEN) (Dx V76 44 Screen for Prostate Cancer) [id number] 08:28AM [employer]   REPORT COMMENT:  FASTING:YES     Test Name Result Flag Reference   PSA, TOTAL 0 9 ng/mL  < OR = 4 0   This test was performed using the Siemens  chemiluminescent method  Values obtained from  different assay methods cannot be used  interchangeably  PSA levels, regardless of  value, should not be interpreted as absolute  evidence of the presence or absence of disease  (Q) Zack Soriano [id number] 08:28AM [employer]     Test Name Result Flag Reference   TESTOSTERONE,TOTAL,MALES 322 ng/dL  456-101   Men with clinically significant hypogonadal symptoms  and testosterone values repeatedly less than   approximately 300 ng/dL may benefit from testosterone   treatment after adequate risk and benefits counseling       (Q) TSH, 3RD GENERATION [id number] 08:28AM [employer]     Test Name Result Flag Reference   TSH 1 38 mIU/L  0 40-4 50       Procedure    Procedure:   Results: 20/20 in both eyes with corrective device, 20/50 in the right eye with corrective device, 20/15 in the left eye with corrective device      Signatures   Electronically signed by : Dona Mallory DO; Jul 28 2017  9:34AM EST                       (Author)

## 2018-01-16 NOTE — RESULT NOTES
Discussion/Summary   labs stable     Verified Results  (1) CBC/PLT/DIFF [id number] 08:28AM Debi Fend     Test Name Result Flag Reference   WHITE BLOOD CELL COUNT 8 6 Thousand/uL  3 8-10 8   RED BLOOD CELL COUNT 4 86 Million/uL  4 20-5 80   HEMOGLOBIN 14 4 g/dL  13 2-17 1   HEMATOCRIT 43 2 %  38 5-50 0   MCV 88 8 fL  80 0-100 0   MCH 29 7 pg  27 0-33 0   MCHC 33 4 g/dL  32 0-36 0   RDW 13 8 %  11 0-15 0   PLATELET COUNT 975 Thousand/uL  140-400   ABSOLUTE NEUTROPHILS 5160 cells/uL  5828-2180   ABSOLUTE LYMPHOCYTES 2339 cells/uL  850-3900   ABSOLUTE MONOCYTES 765 cells/uL  200-950   ABSOLUTE EOSINOPHILS 292 cells/uL     ABSOLUTE BASOPHILS 43 cells/uL  0-200   NEUTROPHILS 60 0 %     LYMPHOCYTES 27 2 %     MONOCYTES 8 9 %     EOSINOPHILS 3 4 %     BASOPHILS 0 5 %     MPV 7 9 fL  7 5-12 5     (1) COMPREHENSIVE METABOLIC PANEL [id number] 83:90GM [facility]     Test Name Result Flag Reference   GLUCOSE 82 mg/dL  65-99   Fasting reference interval   UREA NITROGEN (BUN) 18 mg/dL  7-25   CREATININE 1 11 mg/dL  0 70-1 25   For patients >52years of age, the reference limit  for Creatinine is approximately 13% higher for people  identified as -American  eGFR NON-AFR   AMERICAN 69 mL/min/1 73m2  > OR = 60   eGFR AFRICAN AMERICAN 80 mL/min/1 73m2  > OR = 60   BUN/CREATININE RATIO   7-97   NOT APPLICABLE (calc)   SODIUM 139 mmol/L  135-146   POTASSIUM 3 8 mmol/L  3 5-5 3   CHLORIDE 103 mmol/L     CARBON DIOXIDE 28 mmol/L  20-31   CALCIUM 9 2 mg/dL  8 6-10 3   PROTEIN, TOTAL 7 2 g/dL  6 1-8 1   ALBUMIN 4 1 g/dL  3 6-5 1   GLOBULIN 3 1 g/dL (calc)  1 9-3 7   ALBUMIN/GLOBULIN RATIO 1 3 (calc)  1 0-2 5   BILIRUBIN, TOTAL 0 6 mg/dL  0 2-1 2   ALKALINE PHOSPHATASE 52 U/L     AST 18 U/L  10-35   ALT 15 U/L  9-46     (1) LIPID PANEL, FASTING [id number] 08:28AM Debi Fend     Test Name Result Flag Reference   CHOLESTEROL, TOTAL 148 mg/dL  125-200   HDL CHOLESTEROL 28 mg/dL L > OR = 40 TRIGLICERIDES 694 mg/dL  <150   LDL-CHOLESTEROL 95 mg/dL (calc)  <130   Desirable range <100 mg/dL for patients with CHD or  diabetes and <70 mg/dL for diabetic patients with  known heart disease  CHOL/HDLC RATIO 5 3 (calc) H < OR = 5 0   NON HDL CHOLESTEROL 120 mg/dL (calc)     Target for non-HDL cholesterol is 30 mg/dL higher than   LDL cholesterol target  (1) PSA (SCREEN) (Dx V76 44 Screen for Prostate Cancer) [id number] 08:28AM [employer]   REPORT COMMENT:  FASTING:YES     Test Name Result Flag Reference   PSA, TOTAL 0 9 ng/mL  < OR = 4 0   This test was performed using the Siemens  chemiluminescent method  Values obtained from  different assay methods cannot be used  interchangeably  PSA levels, regardless of  value, should not be interpreted as absolute  evidence of the presence or absence of disease  (Q) Rodriguez Adan [id number] 08:28AM Dickie Barley     Test Name Result Flag Reference   TESTOSTERONE,TOTAL,MALES 322 ng/dL  140-761   Men with clinically significant hypogonadal symptoms  and testosterone values repeatedly less than   approximately 300 ng/dL may benefit from testosterone   treatment after adequate risk and benefits counseling       (Q) TSH, 3RD GENERATION [id number] 08:28AM Dickie Barley     Test Name Result Flag Reference   TSH 1 38 mIU/L  0 40-4 50

## 2018-01-22 VITALS
DIASTOLIC BLOOD PRESSURE: 84 MMHG | RESPIRATION RATE: 16 BRPM | WEIGHT: 235 LBS | SYSTOLIC BLOOD PRESSURE: 130 MMHG | TEMPERATURE: 96.8 F | BODY MASS INDEX: 27.19 KG/M2 | HEART RATE: 70 BPM | HEIGHT: 78 IN

## 2018-01-22 VITALS
HEIGHT: 78 IN | RESPIRATION RATE: 16 BRPM | WEIGHT: 232.38 LBS | HEART RATE: 68 BPM | DIASTOLIC BLOOD PRESSURE: 92 MMHG | SYSTOLIC BLOOD PRESSURE: 138 MMHG | BODY MASS INDEX: 26.89 KG/M2

## 2018-01-23 NOTE — RESULT NOTES
Discussion/Summary   labs acceptable     Verified Results  (1) COMPREHENSIVE METABOLIC PANEL [id number] 23:69ED Kyler Byrd   REPORT COMMENT:  FASTING:YES     Test Name Result Flag Reference   GLUCOSE 79 mg/dL  65-99   Fasting reference interval   UREA NITROGEN (BUN) 18 mg/dL  7-25   CREATININE 1 13 mg/dL  0 70-1 25   For patients >52years of age, the reference limit  for Creatinine is approximately 13% higher for people  identified as -American  eGFR NON-AFR  AMERICAN 68 mL/min/1 73m2  > OR = 60   eGFR AFRICAN AMERICAN 79 mL/min/1 73m2  > OR = 60   BUN/CREATININE RATIO   1-32   NOT APPLICABLE (calc)   SODIUM 142 mmol/L  135-146   POTASSIUM 3 8 mmol/L  3 5-5 3   CHLORIDE 106 mmol/L     CARBON DIOXIDE 29 mmol/L  20-31   CALCIUM 9 1 mg/dL  8 6-10 3   PROTEIN, TOTAL 6 6 g/dL  6 1-8 1   ALBUMIN 4 0 g/dL  3 6-5 1   GLOBULIN 2 6 g/dL (calc)  1 9-3 7   ALBUMIN/GLOBULIN RATIO 1 5 (calc)  1 0-2 5   BILIRUBIN, TOTAL 0 6 mg/dL  0 2-1 2   ALKALINE PHOSPHATASE 47 U/L     AST 22 U/L  10-35   ALT 18 U/L  9-46     (1) LIPID PANEL, FASTING [id number] 08:43AM Kyler Byrd     Test Name Result Flag Reference   CHOLESTEROL, TOTAL 159 mg/dL  <200   HDL CHOLESTEROL 32 mg/dL L >89   TRIGLICERIDES 719 mg/dL  <150   LDL-CHOLESTEROL 107 mg/dL (calc) H    Reference range: <100     Desirable range <100 mg/dL for patients with CHD or  diabetes and <70 mg/dL for diabetic patients with  known heart disease  LDL-C is now calculated using the Luis-Ruano   calculation, which is a validated novel method providing   better accuracy than the Friedewald equation in the   estimation of LDL-C  Clyde Ahmadi  [facility]  1200;270(41): 5541-8157   (http://[employer]/faq/ZQC570)   CHOL/HDLC RATIO 5 0 (calc) H <5 0   NON HDL CHOLESTEROL 127 mg/dL (calc)  <130   For patients with diabetes plus 1 major ASCVD risk   factor, treating to a non-HDL-C goal of <100 mg/dL   (LDL-C of <70 mg/dL) is considered a therapeutic   option

## 2018-01-23 NOTE — CONSULTS
I had the pleasure of evaluating your patient, Diana Robinson  My full evaluation follows:      Chief Complaint  F/U to testing pt req       History of Present Illness  The patient states he has been generally Patient is here for evaluation and management of coronary CT that revealed a CCS of 39 in LM and a fusiform aortic aneurysm of 4 4cm , but doing well since the last visit  Comorbid Illnesses: hypertension, hyperlipidemia and Fusiform ascending aortic aneurysm 4 4cm in Oct 2017; LVH; LAHB  Interval Events: feels well, no complaints  Symptoms: denies chest pain at rest, denies exertional chest pain, denies dyspnea, denies fatigue, denies exercise intolerance, denies palpitations, denies edema, denies orthopnea, denies claudication, denies dizziness and denies orthostatic dizziness  Associated symptoms: no syncope, no PND, no tendency for easy bleeding, no tendency for easy bruising, no TIA symptoms and no recent weight gain  Disease Monitoring:   Medications: the patient is adherent with his medication regimen  He denies medication side effects  Review of Systems      Cardiac: No complaints of chest pain, no palpitations, no fainiting  Skin: No complaints of nonhealing sores or skin rash  Genitourinary: No complaints of recurrent urinary tract infections, frequent urination at night, difficult urination, blood in urine, kidney stones, loss of bladder control, no kidney or prostate problems, no erectile dysfunction  Psychological: No complaints of feeling depressed, anxiety, panic attacks, or difficulty concentrating  General: No complaints of trouble sleeping, lack of energy, fatigue, appetite changes, weight changes, fever, frequent infections, or night sweats  Respiratory: No complaints of shortness of breath, cough with sputum, or wheezing  HEENT: No complaints of serious problems, hearing problems, nose problems, throat problems, or snoring     Gastrointestinal: No complaints of liver problems, nausea, vomiting, heartburn, constipation, bloody stools, diarrhea, problems swallowing, adbominal pain, or rectal bleeding  Hematologic: No complaints of bleeding disorders, anemia, blood clots, or excessive brusing  Neurological: No complaints of numbness, tingling, dizziness, weakness, seizures, headaches, syncope or fainting, AM fatigue, daytime sleepiness, no witnessed apnea episodes  Musculoskeletal: arthritis and swelling/pain, but no back pain     ROS reviewed  Active Problems    1  Aneurysm of abdominal aorta (441 4) (I71 4)   2  Aneurysm, ascending aorta (441 2) (I71 2)   3  Benign essential hypertension (401 1) (I10)   4  Encounter for screening for cardiovascular disorders (V81 2) (Z13 6)   5  Encounter for screening for malignant neoplasm of colon (V76 51) (Z12 11)   6  Encounter for screening for malignant neoplasm of prostate (V76 44) (Z12 5)   7  Erectile dysfunction, unspecified erectile dysfunction type (607 84) (N52 9)   8  Hemorrhoids (455 6) (K64 9)   9  LAFB (left anterior fascicular block) (426 2) (I44 4)   10  Left ventricular hypertrophy (429 3) (I51 7)   11  Mixed hyperlipidemia (272 2) (E78 2)   12  Need for hepatitis C screening test (V73 89) (Z11 59)   13  Need for vaccination (V05 9) (Z23)   14  Screening for genitourinary condition (V81 6) (Z13 89)   15   Thyroid nodule (241 0) (E04 1)    Past Medical History    · History of Acute upper respiratory infection (465 9) (J06 9)   · History of Acute upper respiratory infection (465 9) (J06 9)   · History of Elbow injury (959 3) (S59 909A)   · History of Fecal occult blood test positive (792 1) (R19 5)   · History of abdominal aortic aneurysm (V12 59) (Z86 79)   · History of acute sinusitis (V12 69) (Z87 09)   · History of allergic rhinitis (V12 69) (Z87 09)   · History of allergic rhinitis (V12 69) (Z87 09)   · History of hypertension (V12 59) (Z86 79)   · History of mixed hyperlipidemia (V12 29) (Z86 39)   · 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)   · History of Olecranon bursitis of right elbow (726 33) (M70 21)    The active problems and past medical history were reviewed and updated today  Surgical History    · History of Knee Arthroscopy (Therapeutic)    The surgical history was reviewed and updated today  Family History    · Family history of myocardial infarction (V17 3) (Z82 49)   · Family history of sudden cardiac death (SCD) (V17 41) (Z82 41)    · Family history of cerebrovascular accident (V17 1) (Z82 3)    · Family history of Acute Myocardial Infarction (V17 3)   · Family history of Aneurysm Of Abdominal Aorta   · Family history of Cancer   · Family history of Colon Cancer (V16 0)   · Family history of Gastric Cancer (V16 0)   · Family history of Liver Cancer   · Family history of Lung Cancer (V16 1)   · Family history of Prostate Cancer (V16 42)   · Family history of Stroke Syndrome (V17 1)    The family history was reviewed and updated today  Social History    · History of Current some day smoker (305 1) (F17 200)   · Former smoker (V15 82) (D20 450)   ·   The social history was reviewed and updated today  The social history was reviewed and is unchanged  Current Meds   1  Amlodipine Besy-Benazepril HCl - 5-20 MG Oral Capsule; take 1 capsule daily; Therapy: 38FQF5827 to (Last Rx:13Nov2017)  Requested for: 40OCK4204 Ordered   2  Cetirizine HCl TABS; TAKE 1 TABLET DAILY; Therapy: (Recorded:39Pjp6255) to Recorded   3  Cialis 20 MG Oral Tablet; 1 tab PO PRN  not to repeat in 72 hrs medically necessary; Therapy: 26Apr2016 to (Evaluate:82Gvb4645); Last Rx:26Apr2016 Ordered   4  Co Q 10 CAPS; TAKE 1 CAPSULE TWICE DAILY; Therapy: (Recorded:73Acn8129) to Recorded   5  HydroCHLOROthiazide 12 5 MG Oral Tablet; Take 1 tablet daily; Therapy: 86Tcq0765 to (Last Rx:41Qou9314)  Requested for: 48CBI1538 Ordered   6   Omega-3-acid Ethyl Esters 1 GM Oral Capsule; take 1 capsule twice a day; Therapy: 34RYB3097 to (Last Rx:06Jan2016)  Requested for: 74Bly6292 Ordered   7  Red Yeast Rice CAPS; Therapy: (Recorded:40Nji1806) to Recorded    The medication list was reviewed and updated today  Allergies    1  No Known Drug Allergies    Vitals   Recorded: 94Bgu1410 05:42PM   Heart Rate 68   Respiration 16   Systolic 738, RUE, Sitting   Diastolic 92, RUE, Sitting   BP CUFF SIZE Large   Height 6 ft 7 in   Weight 232 lb 6 oz   BMI Calculated 26 18   BSA Calculated 2 43     Physical Exam    Constitutional - General appearance: No acute distress, well appearing and well nourished  Eyes - Conjunctiva and Sclera examination: Conjunctiva pink, sclera anicteric  Neck - Normal, no JVD   Pulmonary - Respiratory effort: No signs of respiratory distress  Auscultation of lungs: Clear to auscultation  Cardiovascular - Auscultation of heart: Normal rate and rhythm, normal S1 and S2, no murmurs  Pedal pulses: Normal, 2+ bilaterally  Examination of extremities for edema and/or varicosities: Normal     Abdomen - Soft  Musculoskeletal - Gait and station: Normal gait  Skin - Skin: Normal without rashes  Skin is warm and well perfused  Neurologic - Speech normal  No focal deficits  Psychiatric - Orientation to person, place, and time: Normal       Results/Data    Rhythm and rate:  normal sinus rhythm  P-waves:   the P wave is normal  [place] interval is normal    QRS: left anterior hemiblock   ST segment: the ST segments are normal    T waves:   normal  (1) LIPID PANEL, FASTING 62IRR5411 08:43AM Gifty Garcia     Test Name Result Flag Reference   CHOLESTEROL, TOTAL 159 mg/dL  <200   HDL CHOLESTEROL 32 mg/dL L >82   TRIGLICERIDES 333 mg/dL  <150   LDL-CHOLESTEROL 107 mg/dL (calc) H    Reference range: <100     Desirable range <100 mg/dL for patients with CHD or  diabetes and <70 mg/dL for diabetic patients with  known heart disease       LDL-C is now calculated using the Luis-Ruano   calculation, which is a validated novel method providing   better accuracy than the Friedewald equation in the   estimation of LDL-C  Reese Capps  Rosita Coultery  [address]98(38): [phone]   (http://Picatic/faq/GPQ296)   CHOL/HDLC RATIO 5 0 (calc) H <5 0   NON HDL CHOLESTEROL 127 mg/dL (calc)  <130   For patients with diabetes plus 1 major ASCVD risk   factor, treating to a non-HDL-C goal of <100 mg/dL   (LDL-C of <70 mg/dL) is considered a therapeutic   option  CT CORONARY CALCIUM SCORE 86JEY8074 09:55AM Gregorylillie Merrick Order Number: [id number]    - Patient Instructions: To schedule this appointment, please contact Central Scheduling at [phone]  Test Name Result Flag Reference   CT CORONARY CALCIUM SCORE (Report)     CT CORONARY ARTERY CALCIUM SCREENING WITHOUT INTRAVENOUS CONTRAST     INDICATION: Z00 00: Encounter for general adult medical examination without abnormal findings   I10: Essential (primary) hypertension   E78 2: Mixed hyperlipidemia (this clinical history is taken directly from the electronic ordering system)  TECHNIQUE: Low radiation dose computed tomography of the heart was performed with EKG gating, suspended respiration, and without intravenous contrast  This examination, like all CT scans performed in the [facility], was performed    utilizing techniques to minimize radiation dose exposure, including the use of iterative reconstruction and automated exposure control  Postprocessing was performed on a 3-D computer workstation to measure the amount of calcium in the coronary arteries  Imaging was limited to the heart and the immediately adjacent lungs       FINDINGS:      Coronary artery calcium score breakdown is as follows (note: calcified atherosclerotic plaque in the posterior descending artery is included in right coronary artery score for right dominant circulation and left circumflex score for left dominant circulation):     Left main coronary artery: 41   Left anterior descending coronary artery and diagonal branches: 0   Left circumflex coronary artery and left marginal branches: 0   Right coronary artery and right marginal branches: 0     TOTAL coronary calcium score: 41   Calcium score PERCENTILE of age, race, and gender matched database participants in the Multi-Ethnic Study of Atherosclerosis (DELUNA) trial:  42nd       There is fusiform ascending thoracic aortic aneurysm measuring up to 44 mm in caliber, tapering to 37 mm at the level of the proximal aortic arch  There is fatty density within the anterior myocardial wall of the right ventricle seen on image 89 of    series 2  This finding can be seen in patients who are asymptomatic but can also be seen in patients with arrythmogenic right ventricular cardiomyopathy  Emphysematous changes are noted in the lungs  IMPRESSION:     Total cardiac score equals 41  For more useful information regarding the prognostic significance of the calcium score, please consult the calculator at the website https://www [employer]/  aspx  Of note is that all of the calcific    atherosclerotic plaque identified on this examination is located in the left main coronary artery  Fusiform ascending thoracic aortic aneurysm, 44 mm in greatest caliber  Fatty density within the anterior myocardial wall of the right ventricle; this finding can be seen in patients who are asymptomatic but can also be seen in patients with arrythmogenic right ventricular cardiomyopathy  ##sigslh##sigslh            Workstation performed: [id number]     Signed by:   Kim Marrero MD   10/13/17     Assessment    1  Aneurysm, ascending aorta (441 2) (I71 2)   2  Benign essential hypertension (401 1) (I10)   3  LAFB (left anterior fascicular block) (426 2) (I44 4)   4  Left ventricular hypertrophy (429 3) (I51 7)   5  Mixed hyperlipidemia (272 2) (E78 2)   6   Family history of myocardial infarction (V17 3) (Z82 49) : Mother    Plan  Aneurysm, ascending aorta    · Aspirin EC 81 MG Oral Tablet Delayed Release; take 1 tablet by mouth every day   Rx By: Everton Vivar; Dispense: 90 Days ; #:90 Tablet Delayed Release; Refill: 3; For: Aneurysm, ascending aorta; KATLYN = N; Sent To: [facility]/PHARMACY #5331  · Metoprolol Succinate ER 25 MG Oral Tablet Extended Release 24 Hour; Take 1  tablet daily   Rx By: Everton Vivar; Dispense: 0 Days ; #:30 Tablet Extended Release 24 Hour; Refill: 3; For: Aneurysm, ascending aorta; KATLYN = N; Sent To: [facility]/PHARMACY #7296  · Pravastatin Sodium 10 MG Oral Tablet; TAKE 1 TABLET DAILY AS DIRECTED   Rx By: Everton Vivar; Dispense: 90 Days ; #:90 Tablet; Refill: 3; For: Aneurysm, ascending aorta; KATLYN = N; Sent To: [facility]/PHARMACY #5701  · (1) COMPREHENSIVE METABOLIC PANEL; Status:Active; Requested for:12Apr2018; Perform:[facility]; Due:12Apr2019;Ordered; For:Aneurysm, ascending aorta; Ordered By:Vidya Rowley;   · (1) LIPID PANEL, FASTING; Status:Active; Requested for:12Apr2018; Perform:[facility]; Due:12Apr2019;Ordered; For:Aneurysm, ascending aorta; Ordered By:Vidya Rowley;   · CTA CHEST WO W CONTRAST; Status:Need Information - Financial Authorization; Requested for:12Apr2018; Perform:[facility]; PDY:[id number];KLBVCUK; For:Aneurysm, ascending aorta; Ordered By:Vidya Rowley;   · Follow-up visit in 4 Months Evaluation and Treatment  Follow-up  Status: Hold For -  Scheduling  Requested for: 43Npq1675   Ordered; For: Aneurysm, ascending aorta; Ordered By: Everton Vivar Performed:  Due: 91FSY8809   · A diet that is low in fat, cholesterol, and sodium is considered a cardiac diet ;  Status:Complete;   Done: 73BSH6334 06:06PM   Ordered; For:Aneurysm, ascending aorta; Ordered By:Vidya Rowley;   · Begin or continue regular aerobic exercise   Gradually work up to at least {count1}  sessions of {dur1} of exercise a week ; Status:Complete;   Done: 50IQF0918 06:06PM   Ordered; For:Aneurysm, ascending aorta; Ordered By:Vidya Rowley;  LAFB (left anterior fascicular block)    · EKG/ECG- POC; Status:Complete;   Done: 15LQU3122 05:41PM   Perform: In Office; Last Updated Sukhwinder Macedo; 12/12/2017 5:42:05 PM;Ordered; For:LAFB (left anterior fascicular block); Ordered By:Vidya Rowley; Discussion/Summary    Fusiform Ascending Aortic Aneurysm: measured at 4 4cm on Coronary CT in Oct 2017  Would continue to follow this for now, repeat testing in 6 months 4/2018 to assess for growth  Based on the fact that he has this, along with the LM CCS of 40, he would benefit from being on a baby ASA, as well as a B-blocker, and a statin to reduce cardiac risk and optimize medical therapy  HTN: not optimally controlled on current regimen, so I would add a B-blocker, which can reduce the shearing forces on his aneurysm  HLD: as noted above, would initiate a statin  Goal LDL would be <100, but in addition, we would use the statin for properties outside of cholesterol lowering, namely reduction of inflammation  The patient was counseled regarding diagnostic results, instructions for management, risk factor reductions, impressions  total time of encounter was 40 minutes and 21 minutes was spent counseling  Thank you very much for allowing me to participate in the care of this patient  If you have any questions, please do not hesitate to contact me        Signatures   Electronically signed by : JANA Mixon ; Dec 12 2017  6:27PM EST                       (Author)

## 2018-02-13 ENCOUNTER — OFFICE VISIT (OUTPATIENT)
Dept: FAMILY MEDICINE CLINIC | Facility: CLINIC | Age: 68
End: 2018-02-13
Payer: COMMERCIAL

## 2018-02-13 VITALS
RESPIRATION RATE: 22 BRPM | HEIGHT: 78 IN | HEART RATE: 64 BPM | DIASTOLIC BLOOD PRESSURE: 70 MMHG | TEMPERATURE: 97.4 F | SYSTOLIC BLOOD PRESSURE: 126 MMHG | BODY MASS INDEX: 26.15 KG/M2 | WEIGHT: 226 LBS

## 2018-02-13 DIAGNOSIS — J01.00 ACUTE MAXILLARY SINUSITIS, RECURRENCE NOT SPECIFIED: Primary | ICD-10-CM

## 2018-02-13 PROBLEM — E04.1 THYROID NODULE: Status: ACTIVE | Noted: 2017-03-31

## 2018-02-13 PROBLEM — I71.21 ANEURYSM, ASCENDING AORTA: Status: ACTIVE | Noted: 2017-12-12

## 2018-02-13 PROBLEM — I71.2 ANEURYSM, ASCENDING AORTA (HCC): Status: ACTIVE | Noted: 2017-12-12

## 2018-02-13 PROCEDURE — 99213 OFFICE O/P EST LOW 20 MIN: CPT | Performed by: FAMILY MEDICINE

## 2018-02-13 RX ORDER — HYDROCHLOROTHIAZIDE 12.5 MG/1
1 TABLET ORAL DAILY
COMMUNITY
Start: 2015-09-17 | End: 2018-07-02 | Stop reason: SDUPTHER

## 2018-02-13 RX ORDER — ASPIRIN 81 MG/1
1 TABLET ORAL DAILY
COMMUNITY
Start: 2017-12-12 | End: 2018-03-09 | Stop reason: SDUPTHER

## 2018-02-13 RX ORDER — AMLODIPINE BESYLATE AND BENAZEPRIL HYDROCHLORIDE 5; 20 MG/1; MG/1
1 CAPSULE ORAL DAILY
COMMUNITY
Start: 2013-02-04 | End: 2018-07-31 | Stop reason: SDUPTHER

## 2018-02-13 RX ORDER — ASCORBIC ACID 1000 MG
1 TABLET ORAL 2 TIMES DAILY
COMMUNITY

## 2018-02-13 RX ORDER — PRAVASTATIN SODIUM 10 MG
1 TABLET ORAL DAILY
COMMUNITY
Start: 2017-12-12 | End: 2018-07-31 | Stop reason: SDUPTHER

## 2018-02-13 RX ORDER — AMPICILLIN TRIHYDRATE 250 MG
CAPSULE ORAL
COMMUNITY
End: 2018-02-13

## 2018-02-13 RX ORDER — CETIRIZINE HYDROCHLORIDE 5 MG/1
1 TABLET ORAL DAILY
COMMUNITY
End: 2018-02-13

## 2018-02-13 RX ORDER — METOPROLOL SUCCINATE 25 MG/1
1 TABLET, EXTENDED RELEASE ORAL DAILY
COMMUNITY
Start: 2017-12-12 | End: 2018-03-09 | Stop reason: SDUPTHER

## 2018-02-13 RX ORDER — AZITHROMYCIN 250 MG/1
TABLET, FILM COATED ORAL
Qty: 6 TABLET | Refills: 0 | Status: SHIPPED | OUTPATIENT
Start: 2018-02-13 | End: 2018-02-18

## 2018-02-13 RX ORDER — TADALAFIL 20 MG/1
TABLET ORAL
COMMUNITY
Start: 2016-04-26 | End: 2018-02-13

## 2018-02-13 NOTE — PROGRESS NOTES
Assessment/Plan:    No problem-specific Assessment & Plan notes found for this encounter  Diagnoses and all orders for this visit:    Acute maxillary sinusitis, recurrence not specified  -     azithromycin (ZITHROMAX) 250 mg tablet; 2 tabs on day 1, 1 tab a day for 4 days after    Other orders  -     amLODIPine-benazepril (LOTREL 5-20) 5-20 MG per capsule; Take 1 capsule by mouth daily  -     aspirin (ECOTRIN LOW STRENGTH) 81 mg EC tablet; Take 1 tablet by mouth daily  -     Discontinue: cetirizine (ZyrTEC) 5 MG tablet; Take 1 tablet by mouth daily  -     Discontinue: tadalafil (CIALIS) 20 MG tablet; Take by mouth  -     Coenzyme Q10 (CO Q 10) 10 MG CAPS; Take 1 capsule by mouth 2 (two) times a day  -     hydrochlorothiazide (HYDRODIURIL) 12 5 mg tablet; Take 1 tablet by mouth daily  -     metoprolol succinate (TOPROL-XL) 25 mg 24 hr tablet; Take 1 tablet by mouth daily  -     OMEGA-3 FATTY ACIDS PO; Take 1 capsule by mouth 2 (two) times a day  -     pravastatin (PRAVACHOL) 10 mg tablet; Take 1 tablet by mouth daily  -     Discontinue: Red Yeast Rice 600 MG CAPS; Take by mouth          Patient Instructions   Rhinosinusitis   WHAT YOU NEED TO KNOW:   Rhinosinusitis (RS) is inflammation of your nose and sinuses  It commonly begins as a virus, often as a common cold  Viruses usually last 7 to 10 days and do not need treatment  When the virus does not get better on its own, you may have bacterial RS  This means that bacteria have begun to grow inside your sinuses  Acute RS lasts less than 4 weeks  Chronic RS lasts 12 weeks or more  Recurrent RS is when you have 4 or more episodes of RS in one year  DISCHARGE INSTRUCTIONS:   Return to the emergency department if:   · Your eye and eyelid are red, swollen, and painful  · You cannot open your eye  · You have double vision or you cannot see  · Your eyeball bulges out or you cannot move your eye       · You are more sleepy than normal or you notice changes in your ability to think, move, or talk  · You have a stiff neck, a fever, or a bad headache  · You have swelling of your forehead or scalp  Contact your healthcare provider if:   · Your symptoms are worse or do not improve after 3 to 5 days of treatment  · You have questions or concerns about your condition or care  Medicines: You may need any of the following:  · Acetaminophen  decreases pain and fever  It is available without a doctor's order  Ask how much to take and how often to take it  Follow directions  Acetaminophen can cause liver damage if not taken correctly  · NSAIDs , such as ibuprofen, help decrease swelling, pain, and fever  This medicine is available with or without a doctor's order  NSAIDs can cause stomach bleeding or kidney problems in certain people  If you take blood thinner medicine, always ask your healthcare provider if NSAIDs are safe for you  Always read the medicine label and follow directions  · Nasal steroid sprays  decrease inflammation in your nose and sinuses  · Decongestants  reduce swelling and drain mucus in the nose and sinuses  They may help you breathe easier  · Antihistamines  dry mucus in the nose and relieve sneezing  · Antibiotics  treat a bacterial infection and may be needed if your symptoms do not improve or they get worse  · Take your medicine as directed  Contact your healthcare provider if you think your medicine is not helping or if you have side effects  Tell him or her if you are allergic to any medicine  Keep a list of the medicines, vitamins, and herbs you take  Include the amounts, and when and why you take them  Bring the list or the pill bottles to follow-up visits  Carry your medicine list with you in case of an emergency  Self-care:   · Rinse your sinuses  Use a sinus rinse device to rinse your nasal passages with a saline (salt water) solution  This will help thin the mucus in your nose and rinse away pollen and dirt  It will also help reduce swelling so you can breathe normally  Ask your healthcare provider how often to do this  · Breathe in steam   Heat a bowl of water until you see steam  Lean over the bowl and make a tent over your head with a large towel  Breathe deeply for about 20 minutes  Be careful not to get too close to the steam or burn yourself  Do this 3 times a day  You can also breathe deeply when you take a hot shower  · Sleep with your head elevated  Place an extra pillow under your head before you go to sleep to help your sinuses drain  · Drink liquids as directed  Ask your healthcare provider how much liquid to drink each day and which liquids are best for you  Liquids will thin the mucus in your nose and help it drain  Avoid drinks that contain alcohol or caffeine  · Do not smoke, and avoid secondhand smoke  Nicotine and other chemicals in cigarettes and cigars can make your symptoms worse  Ask your healthcare provider for information if you currently smoke and need help to quit  E-cigarettes or smokeless tobacco still contain nicotine  Talk to your healthcare provider before you use these products  Follow up with your healthcare provider as directed: Follow up if your symptoms are worse or not better after 3 to 5 days of treatment  Write down your questions so you remember to ask them during your visits  © 2017 [employer]  Information is for End User's use only and may not be sold, redistributed or otherwise used for commercial purposes  All illustrations and images included in CareNotes® are the copyrighted property of A D A M , Inc  or Tristen Bender  The above information is an  only  It is not intended as medical advice for individual conditions or treatments  Talk to your doctor, nurse or pharmacist before following any medical regimen to see if it is safe and effective for you  Return for Next scheduled follow up      Subjective: Patient ID: Alexis Cabello is a 74 y o  male  Chief Complaint   Patient presents with    Cough     harsh productive cough for the past few weeks         Pt states he felt Gwenyth Ace   Pt states he has been coughing off and on possibly for a month has been productive for two weeks  Pt states he feels like he has chest congestion  Sometimes his nose runs  Pt states he had some sinus pain a week and a half ago  Pt states he had nasal discharge  Chills  No fever  No flu like symptosm          Cough   Pertinent negatives include no chest pain, chills, ear pain, eye redness or fever  The following portions of the patient's history were reviewed and updated as appropriate: allergies, current medications, past family history, past medical history, past social history, past surgical history and problem list     Review of Systems   Constitutional: Negative for activity change, appetite change, chills, diaphoresis, fatigue, fever and unexpected weight change  HENT: Positive for congestion, sinus pain, sinus pressure and sneezing  Negative for ear pain  Eyes: Negative for pain, discharge, redness and itching  Respiratory: Positive for cough  Negative for chest tightness  Cardiovascular: Negative for chest pain, palpitations and leg swelling  Gastrointestinal: Negative for abdominal distention and abdominal pain           Current Outpatient Prescriptions   Medication Sig Dispense Refill    amLODIPine-benazepril (LOTREL 5-20) 5-20 MG per capsule Take 1 capsule by mouth daily      aspirin (ECOTRIN LOW STRENGTH) 81 mg EC tablet Take 1 tablet by mouth daily      Coenzyme Q10 (CO Q 10) 10 MG CAPS Take 1 capsule by mouth 2 (two) times a day      hydrochlorothiazide (HYDRODIURIL) 12 5 mg tablet Take 1 tablet by mouth daily      metoprolol succinate (TOPROL-XL) 25 mg 24 hr tablet Take 1 tablet by mouth daily      OMEGA-3 FATTY ACIDS PO Take 1 capsule by mouth 2 (two) times a day      pravastatin (PRAVACHOL) 10 mg tablet Take 1 tablet by mouth daily      azithromycin (ZITHROMAX) 250 mg tablet 2 tabs on day 1, 1 tab a day for 4 days after 6 tablet 0     No current facility-administered medications for this visit  Objective:    /70   Pulse 64   Temp (!) 97 4 °F (36 3 °C)   Resp 22   Ht 6' 7" (2 007 m)   Wt 103 kg (226 lb)   BMI 25 46 kg/m²        Physical Exam   Constitutional: He appears well-developed and well-nourished  No distress  HENT:   Right Ear: Tympanic membrane is bulging  Left Ear: Tympanic membrane is erythematous and bulging  Nose: Mucosal edema present  Mouth/Throat:       Eyes: Conjunctivae are normal  Pupils are equal, round, and reactive to light  Neck: Normal range of motion  Cardiovascular: Normal rate  Pulmonary/Chest: Effort normal    Musculoskeletal: Normal range of motion  Neurological: He is alert  Skin: He is not diaphoretic  Nursing note and vitals reviewed               Ami Sanchez DO

## 2018-02-13 NOTE — PATIENT INSTRUCTIONS
Rhinosinusitis   WHAT YOU NEED TO KNOW:   Rhinosinusitis (RS) is inflammation of your nose and sinuses  It commonly begins as a virus, often as a common cold  Viruses usually last 7 to 10 days and do not need treatment  When the virus does not get better on its own, you may have bacterial RS  This means that bacteria have begun to grow inside your sinuses  Acute RS lasts less than 4 weeks  Chronic RS lasts 12 weeks or more  Recurrent RS is when you have 4 or more episodes of RS in one year  DISCHARGE INSTRUCTIONS:   Return to the emergency department if:   · Your eye and eyelid are red, swollen, and painful  · You cannot open your eye  · You have double vision or you cannot see  · Your eyeball bulges out or you cannot move your eye  · You are more sleepy than normal or you notice changes in your ability to think, move, or talk  · You have a stiff neck, a fever, or a bad headache  · You have swelling of your forehead or scalp  Contact your healthcare provider if:   · Your symptoms are worse or do not improve after 3 to 5 days of treatment  · You have questions or concerns about your condition or care  Medicines: You may need any of the following:  · Acetaminophen  decreases pain and fever  It is available without a doctor's order  Ask how much to take and how often to take it  Follow directions  Acetaminophen can cause liver damage if not taken correctly  · NSAIDs , such as ibuprofen, help decrease swelling, pain, and fever  This medicine is available with or without a doctor's order  NSAIDs can cause stomach bleeding or kidney problems in certain people  If you take blood thinner medicine, always ask your healthcare provider if NSAIDs are safe for you  Always read the medicine label and follow directions  · Nasal steroid sprays  decrease inflammation in your nose and sinuses  · Decongestants  reduce swelling and drain mucus in the nose and sinuses   They may help you breathe easier  · Antihistamines  dry mucus in the nose and relieve sneezing  · Antibiotics  treat a bacterial infection and may be needed if your symptoms do not improve or they get worse  · Take your medicine as directed  Contact your healthcare provider if you think your medicine is not helping or if you have side effects  Tell him or her if you are allergic to any medicine  Keep a list of the medicines, vitamins, and herbs you take  Include the amounts, and when and why you take them  Bring the list or the pill bottles to follow-up visits  Carry your medicine list with you in case of an emergency  Self-care:   · Rinse your sinuses  Use a sinus rinse device to rinse your nasal passages with a saline (salt water) solution  This will help thin the mucus in your nose and rinse away pollen and dirt  It will also help reduce swelling so you can breathe normally  Ask your healthcare provider how often to do this  · Breathe in steam   Heat a bowl of water until you see steam  Lean over the bowl and make a tent over your head with a large towel  Breathe deeply for about 20 minutes  Be careful not to get too close to the steam or burn yourself  Do this 3 times a day  You can also breathe deeply when you take a hot shower  · Sleep with your head elevated  Place an extra pillow under your head before you go to sleep to help your sinuses drain  · Drink liquids as directed  Ask your healthcare provider how much liquid to drink each day and which liquids are best for you  Liquids will thin the mucus in your nose and help it drain  Avoid drinks that contain alcohol or caffeine  · Do not smoke, and avoid secondhand smoke  Nicotine and other chemicals in cigarettes and cigars can make your symptoms worse  Ask your healthcare provider for information if you currently smoke and need help to quit  E-cigarettes or smokeless tobacco still contain nicotine   Talk to your healthcare provider before you use these products  Follow up with your healthcare provider as directed: Follow up if your symptoms are worse or not better after 3 to 5 days of treatment  Write down your questions so you remember to ask them during your visits  © 2017 [employer] Information is for End User's use only and may not be sold, redistributed or otherwise used for commercial purposes  All illustrations and images included in CareNotes® are the copyrighted property of [employer] M [employer]  or [employer]  The above information is an  only  It is not intended as medical advice for individual conditions or treatments  Talk to your doctor, nurse or pharmacist before following any medical regimen to see if it is safe and effective for you

## 2018-02-16 ENCOUNTER — TELEPHONE (OUTPATIENT)
Dept: CARDIOLOGY CLINIC | Facility: CLINIC | Age: 68
End: 2018-02-16

## 2018-02-16 NOTE — TELEPHONE ENCOUNTER
S/w Demetria Box, he would like to wait until June to get testing done and have o/v  Ok w/ you? Please advise     C/b # [phone]

## 2018-02-28 ENCOUNTER — TRANSCRIBE ORDERS (OUTPATIENT)
Dept: ADMINISTRATIVE | Facility: HOSPITAL | Age: 68
End: 2018-02-28

## 2018-02-28 DIAGNOSIS — I71.3 RUPTURED ABDOMINAL AORTIC ANEURYSM (AAA) (HCC): Primary | ICD-10-CM

## 2018-03-09 DIAGNOSIS — I10 HYPERTENSION, UNSPECIFIED TYPE: Primary | ICD-10-CM

## 2018-03-09 RX ORDER — METOPROLOL SUCCINATE 25 MG/1
25 TABLET, EXTENDED RELEASE ORAL DAILY
Qty: 90 TABLET | Refills: 3 | Status: SHIPPED | OUTPATIENT
Start: 2018-03-09 | End: 2018-07-31 | Stop reason: SDUPTHER

## 2018-03-09 RX ORDER — ASPIRIN 81 MG/1
81 TABLET ORAL DAILY
Qty: 90 TABLET | Refills: 6 | Status: SHIPPED | OUTPATIENT
Start: 2018-03-09 | End: 2019-02-28 | Stop reason: SDUPTHER

## 2018-04-12 DIAGNOSIS — I71.2 THORACIC AORTIC ANEURYSM WITHOUT RUPTURE (HCC): ICD-10-CM

## 2018-06-01 LAB
ALBUMIN SERPL-MCNC: 4.2 G/DL (ref 3.6–5.1)
ALBUMIN/GLOB SERPL: 1.6 (CALC) (ref 1–2.5)
ALP SERPL-CCNC: 51 U/L (ref 40–115)
ALT SERPL-CCNC: 16 U/L (ref 9–46)
AST SERPL-CCNC: 20 U/L (ref 10–35)
BILIRUB SERPL-MCNC: 0.7 MG/DL (ref 0.2–1.2)
BUN SERPL-MCNC: 23 MG/DL (ref 7–25)
BUN/CREAT SERPL: NORMAL (CALC) (ref 6–22)
CALCIUM SERPL-MCNC: 9.1 MG/DL (ref 8.6–10.3)
CHLORIDE SERPL-SCNC: 106 MMOL/L (ref 98–110)
CHOLEST SERPL-MCNC: 145 MG/DL
CHOLEST/HDLC SERPL: 3.5 (CALC)
CO2 SERPL-SCNC: 28 MMOL/L (ref 20–31)
CREAT SERPL-MCNC: 1.16 MG/DL (ref 0.7–1.25)
GLOBULIN SER CALC-MCNC: 2.7 G/DL (CALC) (ref 1.9–3.7)
GLUCOSE SERPL-MCNC: 80 MG/DL (ref 65–99)
HDLC SERPL-MCNC: 41 MG/DL
LDLC SERPL CALC-MCNC: 92 MG/DL (CALC)
NONHDLC SERPL-MCNC: 104 MG/DL (CALC)
POTASSIUM SERPL-SCNC: 3.8 MMOL/L (ref 3.5–5.3)
PROT SERPL-MCNC: 6.9 G/DL (ref 6.1–8.1)
SL AMB EGFR AFRICAN AMERICAN: 76 ML/MIN/1.73M2
SL AMB EGFR NON AFRICAN AMERICAN: 65 ML/MIN/1.73M2
SODIUM SERPL-SCNC: 141 MMOL/L (ref 135–146)
TRIGL SERPL-MCNC: 40 MG/DL

## 2018-06-04 ENCOUNTER — HOSPITAL ENCOUNTER (OUTPATIENT)
Dept: RADIOLOGY | Facility: MEDICAL CENTER | Age: 68
Discharge: HOME | End: 2018-06-04
Payer: COMMERCIAL

## 2018-06-04 DIAGNOSIS — I71.3 RUPTURED ABDOMINAL AORTIC ANEURYSM (AAA) (HCC): ICD-10-CM

## 2018-06-04 PROCEDURE — 71275 CT ANGIOGRAPHY CHEST: CPT

## 2018-06-04 RX ADMIN — IOHEXOL 100 ML: 350 INJECTION, SOLUTION INTRAVENOUS at 09:56

## 2018-06-11 ENCOUNTER — OFFICE VISIT (OUTPATIENT)
Dept: CARDIOLOGY CLINIC | Facility: CLINIC | Age: 68
End: 2018-06-11
Payer: COMMERCIAL

## 2018-06-11 VITALS
HEART RATE: 62 BPM | OXYGEN SATURATION: 96 % | BODY MASS INDEX: 26.03 KG/M2 | HEIGHT: 78 IN | DIASTOLIC BLOOD PRESSURE: 76 MMHG | SYSTOLIC BLOOD PRESSURE: 118 MMHG | WEIGHT: 225 LBS

## 2018-06-11 DIAGNOSIS — E78.2 MIXED HYPERLIPIDEMIA: ICD-10-CM

## 2018-06-11 DIAGNOSIS — I71.2 ANEURYSM, ASCENDING AORTA (HCC): Primary | ICD-10-CM

## 2018-06-11 DIAGNOSIS — I10 BENIGN ESSENTIAL HYPERTENSION: ICD-10-CM

## 2018-06-11 DIAGNOSIS — I51.7 LEFT VENTRICULAR HYPERTROPHY: ICD-10-CM

## 2018-06-11 PROCEDURE — 99214 OFFICE O/P EST MOD 30 MIN: CPT | Performed by: INTERNAL MEDICINE

## 2018-06-11 NOTE — PROGRESS NOTES
Cardiology Follow Up    Melinda Hyatt  1950  [id number]     [address] [facility]  [address] [place] [address] [address]    1  Aneurysm, ascending aorta ([address] )     2  Benign essential hypertension     3  Left ventricular hypertrophy     4  Mixed hyperlipidemia         Interval History: Patient feels well, no complaints  Staying active, doing well  Patient Active Problem List   Diagnosis    Aneurysm, ascending aorta (HCC)    Aneurysm of abdominal aorta (HCC)    Benign essential hypertension    Erectile dysfunction    Left ventricular hypertrophy    Mixed hyperlipidemia    Thyroid nodule     Past Medical History:   Diagnosis Date    Hypertension      Social History     Social History    Marital status: /Civil Union     Spouse name: N/A    Number of children: N/A    Years of education: N/A     Occupational History    Not on file  Social History Main Topics    Smoking status: Former Smoker    Smokeless tobacco: Never Used    Alcohol use No    Drug use: No    Sexual activity: Not on file     Other Topics Concern    Not on file     Social History Narrative    No narrative on file      No family history on file  No past surgical history on file      Current Outpatient Prescriptions:     amLODIPine-benazepril (LOTREL 5-20) 5-20 MG per capsule, Take 1 capsule by mouth daily, Disp: , Rfl:     aspirin (ECOTRIN LOW STRENGTH) 81 mg EC tablet, Take 1 tablet (81 mg total) by mouth daily, Disp: 90 tablet, Rfl: 6    Coenzyme Q10 (CO Q 10) 10 MG CAPS, Take 1 capsule by mouth 2 (two) times a day, Disp: , Rfl:     hydrochlorothiazide (HYDRODIURIL) 12 5 mg tablet, Take 1 tablet by mouth daily, Disp: , Rfl:     metoprolol succinate (TOPROL-XL) 25 mg 24 hr tablet, Take 1 tablet (25 mg total) by mouth daily, Disp: 90 tablet, Rfl: 3    OMEGA-3 FATTY ACIDS PO, Take 1 capsule by mouth 2 (two) times a day, Disp: , Rfl:     pravastatin (PRAVACHOL) 10 mg tablet, Take 1 tablet by mouth daily, Disp: , Rfl:   No Known Allergies    Labs:  Orders Only on 06/01/2018   Component Date Value    Total Cholesterol 06/01/2018 145     SL AMB HDL CHOLESTEROL 06/01/2018 41     Triglycerides 06/01/2018 40     SL AMB LDL-CHOLESTEROL 06/01/2018 92     SL AMB CHOL/HDLC RATIO 06/01/2018 3 5     SL AMB NON HDL CHOLESTER* 06/01/2018 104     SL AMB GLUCOSE 06/01/2018 80     BUN 06/01/2018 23     Creatinine, Serum 06/01/2018 1 16     eGFR Non  06/01/2018 65     SL AMB EGFR  AMER* 06/01/2018 76     SL AMB BUN/CREATININE RA* 46/51/8827 NOT APPLICABLE     SL AMB SODIUM 06/01/2018 141     SL AMB POTASSIUM 06/01/2018 3 8     SL AMB CHLORIDE 06/01/2018 106     SL AMB CARBON DIOXIDE 06/01/2018 28     SL AMB CALCIUM 06/01/2018 9 1     SL AMB PROTEIN, TOTAL 06/01/2018 6 9     Serum Albumin 06/01/2018 4 2     SL AMB GLOBULIN 06/01/2018 2 7     SL AMB ALBUMIN/GLOBULIN * 06/01/2018 1 6     SL AMB BILIRUBIN, TOTAL 06/01/2018 0 7     SL AMB ALKALINE PHOSPHAT* 06/01/2018 51     SL AMB AST 06/01/2018 20     SL AMB ALT 06/01/2018 16      Lab Results   Component Value Date    CHOL 159 12/08/2017    TRIG 40 06/01/2018    HDL 32 (L) 12/08/2017    LDLDIRECT 121 11/21/2015     Imaging: Cta Chest Wo W Contrast    Result Date: 6/5/2018  Narrative: CT ANGIOGRAM OF THE CHEST, WITH AND WITHOUT IV CONTRAST INDICATION:  Aneurysm, follow-up COMPARISON: October 13, 2017 TECHNIQUE:  CT angiogram examination of the chest was performed according to standard protocol  Contrast as well as noncontrast images were obtained  This examination, like all CT scans performed in the [facility], was performed utilizing techniques to minimize radiation dose exposure, including the use of iterative reconstruction and automated exposure control    3D reconstructions were performed an independent workstation, and are supplied for review  Rad dose 1128 mGy-cm IV Contrast:  100 mL of iohexol (OMNIPAQUE)  FINDINGS: VASCULAR STRUCTURES:  There is a small aneurysm of the ascending aorta similar in size to October 2017  Maximal diameter and the mid ascending segment is measured at 44 mm  Proximal and distal ascending measurements are 41 mm and 39 mm, respectively  There is a traditional branching pattern of the aortic arch with no significant great vessel stenosis  Descending thoracic aorta is normal in caliber  There is mild atherosclerotic change  Mild ectasia is also seen in the juxtarenal aorta with eccentric mural plaque measuring 25 mm diameter  There is no significant celiac or SMA stenosis  Celiac artery is ectatic diffusely measuring 11 mm  No significant renal artery stenosis is seen  OTHER FINDINGS: HEART:  Normal cardiac size  No pericardial effusion  LUNGS:  Unremarkable  PLEURA:  No pleural effusion  MEDIASTINUM AND NIRMALA:  No mass or significant lymphadenopathy  CHEST WALL AND LOWER NECK:  Unremarkable  VISUALIZED STRUCTURES IN THE UPPER ABDOMEN:  Probable cysts left kidney  Smaller exophytic cyst involving the upper pole posteriorly measures slightly greater in attenuation than would be expected for simple cyst though no enhancement is seen compared to noncontrast images  Impression: 1  Stable 44 mm fusiform aneurysm of the ascending aorta not significant change since October 2017 2  There is a 1 3 cm sharply marginated cystic lesion in the upper pole left kidney  According to our institutional consensus and the recommendations of the [employer] of [employer] ([address] 1625;7:811-912), this finding requires no followup  Additional 17 mm simple cyst is also present  Workstation performed: [id number]       Review of Systems:  Review of Systems   Constitutional: Negative for activity change, appetite change, fatigue and fever  HENT: Negative for nosebleeds and sore throat      Eyes: Negative for photophobia and visual disturbance  Respiratory: Negative for cough, chest tightness, shortness of breath and wheezing  Cardiovascular: Negative for chest pain, palpitations and leg swelling  Gastrointestinal: Negative for abdominal pain, diarrhea, nausea and vomiting  Endocrine: Negative for polyuria  Genitourinary: Negative for dysuria, frequency and hematuria  Musculoskeletal: Negative for arthralgias, back pain and gait problem  Skin: Negative for pallor and rash  Neurological: Negative for dizziness, syncope, speech difficulty and light-headedness  Hematological: Does not bruise/bleed easily  Psychiatric/Behavioral: Negative for agitation, behavioral problems and confusion  Physical Exam:  Physical Exam   Constitutional: He is oriented to person, place, and time  He appears well-developed and well-nourished  No distress  HENT:   Head: Normocephalic and atraumatic  Nose: Nose normal    Eyes: EOM are normal  Pupils are equal, round, and reactive to light  No scleral icterus  Neck: Normal range of motion  Neck supple  No JVD present  Cardiovascular: Normal rate, regular rhythm, S1 normal and S2 normal   Exam reveals no gallop, no S3, no distant heart sounds and no friction rub  No murmur heard  No systolic murmur is present   Pulmonary/Chest: Effort normal and breath sounds normal  No respiratory distress  He has no wheezes  He has no rales  Abdominal: Soft  Bowel sounds are normal  He exhibits no distension  Musculoskeletal: He exhibits no edema or deformity  Neurological: He is alert and oriented to person, place, and time  No cranial nerve deficit  Skin: Skin is warm and dry  He is not diaphoretic  No erythema  Psychiatric: He has a normal mood and affect  His behavior is normal    Vitals reviewed  Blood pressure 118/76, pulse 62, height 6' 7" (2 007 m), weight 102 kg (225 lb), SpO2 96 %      Discussion/Summary:  Fusiform Ascending Aortic Aneurysm: measured at 4 4cm on Coronary CT in Oct 2017  Would continue to follow this for now, repeat testing in 6 months 6/2018 to assess for growth - revealing stable size at 4 4cm  Based on the fact that he has this, along with the LM CCS of 40, he would benefit from being on a baby ASA, as well as a B-blocker, and a statin to reduce cardiac risk and optimize medical therapy  Next evaluation at the [facility] in April 2019  HTN: well controlled on current regimen  Continued on a B-blocker, which can reduce the shearing forces on his aneurysm  HLD: as noted above, would initiate a statin  Goal LDL would be <100, but in addition, we would use the statin for properties outside of cholesterol lowering, namely reduction of inflammation  Most recent eval in June 2018 revealed LDL of 92

## 2018-06-11 NOTE — PATIENT INSTRUCTIONS

## 2018-07-02 DIAGNOSIS — I10 ESSENTIAL HYPERTENSION: Primary | ICD-10-CM

## 2018-07-02 RX ORDER — HYDROCHLOROTHIAZIDE 12.5 MG/1
12.5 TABLET ORAL DAILY
Qty: 90 TABLET | Refills: 3 | Status: SHIPPED | OUTPATIENT
Start: 2018-07-02 | End: 2018-10-11 | Stop reason: SDUPTHER

## 2018-07-31 DIAGNOSIS — E78.5 HYPERLIPIDEMIA, UNSPECIFIED HYPERLIPIDEMIA TYPE: ICD-10-CM

## 2018-07-31 DIAGNOSIS — I10 HYPERTENSION, UNSPECIFIED TYPE: Primary | ICD-10-CM

## 2018-07-31 RX ORDER — METOPROLOL SUCCINATE 25 MG/1
25 TABLET, EXTENDED RELEASE ORAL DAILY
Qty: 90 TABLET | Refills: 3 | Status: SHIPPED | OUTPATIENT
Start: 2018-07-31 | End: 2019-06-24 | Stop reason: SDUPTHER

## 2018-07-31 RX ORDER — AMLODIPINE BESYLATE AND BENAZEPRIL HYDROCHLORIDE 5; 20 MG/1; MG/1
1 CAPSULE ORAL DAILY
Qty: 90 CAPSULE | Refills: 3 | Status: SHIPPED | OUTPATIENT
Start: 2018-07-31 | End: 2019-06-24 | Stop reason: SDUPTHER

## 2018-07-31 RX ORDER — PRAVASTATIN SODIUM 10 MG
10 TABLET ORAL DAILY
Qty: 90 TABLET | Refills: 3 | Status: SHIPPED | OUTPATIENT
Start: 2018-07-31 | End: 2019-06-24 | Stop reason: SDUPTHER

## 2018-10-11 DIAGNOSIS — I10 ESSENTIAL HYPERTENSION: ICD-10-CM

## 2018-10-11 RX ORDER — HYDROCHLOROTHIAZIDE 12.5 MG/1
12.5 TABLET ORAL DAILY
Qty: 90 TABLET | Refills: 2 | Status: SHIPPED | OUTPATIENT
Start: 2018-10-11 | End: 2019-05-23 | Stop reason: SDUPTHER

## 2018-10-24 ENCOUNTER — TELEPHONE (OUTPATIENT)
Dept: CARDIOLOGY CLINIC | Facility: CLINIC | Age: 68
End: 2018-10-24

## 2018-10-24 NOTE — TELEPHONE ENCOUNTER
Jennifer Nurse called requesting script for CTA chest  Last one done 6/2018  O/v 12/20    Please advise    C/b # [phone]

## 2018-11-15 ENCOUNTER — TELEPHONE (OUTPATIENT)
Dept: CARDIOLOGY CLINIC | Facility: CLINIC | Age: 68
End: 2018-11-15

## 2018-11-15 DIAGNOSIS — I71.3 RUPTURED ABDOMINAL AORTIC ANEURYSM (HCC): Primary | ICD-10-CM

## 2018-11-15 NOTE — TELEPHONE ENCOUNTER
P/C asking to order CTA  He would like to call now and schedule CTA for March and f/u in office in April

## 2019-02-28 DIAGNOSIS — I10 HYPERTENSION, UNSPECIFIED TYPE: ICD-10-CM

## 2019-02-28 RX ORDER — ASPIRIN 81 MG/1
TABLET ORAL
Qty: 90 TABLET | Refills: 2 | Status: SHIPPED | OUTPATIENT
Start: 2019-02-28

## 2019-03-05 ENCOUNTER — HOSPITAL ENCOUNTER (OUTPATIENT)
Dept: RADIOLOGY | Facility: MEDICAL CENTER | Age: 69
Discharge: HOME | End: 2019-03-05
Payer: COMMERCIAL

## 2019-03-05 DIAGNOSIS — I71.3 RUPTURED ABDOMINAL AORTIC ANEURYSM (HCC): ICD-10-CM

## 2019-03-05 PROCEDURE — 71275 CT ANGIOGRAPHY CHEST: CPT

## 2019-03-05 RX ADMIN — IOHEXOL 100 ML: 350 INJECTION, SOLUTION INTRAVENOUS at 10:33

## 2019-03-20 ENCOUNTER — OFFICE VISIT (OUTPATIENT)
Dept: CARDIOLOGY CLINIC | Facility: CLINIC | Age: 69
End: 2019-03-20
Payer: COMMERCIAL

## 2019-03-20 VITALS
BODY MASS INDEX: 25.34 KG/M2 | WEIGHT: 219 LBS | SYSTOLIC BLOOD PRESSURE: 130 MMHG | DIASTOLIC BLOOD PRESSURE: 82 MMHG | HEART RATE: 63 BPM | HEIGHT: 78 IN

## 2019-03-20 DIAGNOSIS — I10 BENIGN ESSENTIAL HYPERTENSION: ICD-10-CM

## 2019-03-20 DIAGNOSIS — I51.7 LEFT VENTRICULAR HYPERTROPHY: Primary | ICD-10-CM

## 2019-03-20 DIAGNOSIS — E78.2 MIXED HYPERLIPIDEMIA: ICD-10-CM

## 2019-03-20 DIAGNOSIS — I71.2 ANEURYSM, ASCENDING AORTA (HCC): ICD-10-CM

## 2019-03-20 PROCEDURE — 93000 ELECTROCARDIOGRAM COMPLETE: CPT | Performed by: INTERNAL MEDICINE

## 2019-03-20 PROCEDURE — 99214 OFFICE O/P EST MOD 30 MIN: CPT | Performed by: INTERNAL MEDICINE

## 2019-03-20 NOTE — PATIENT INSTRUCTIONS
Thoracic Aortic Aneurysm   WHAT YOU NEED TO KNOW:   What is a thoracic aortic aneurysm? A thoracic aortic aneurysm (TAA) is a bulge in your aorta that occurs when the aorta's walls are weakened  The aorta is a large blood vessel that extends from your heart down the center of your chest and into your abdomen  What causes a TAA? · Atherosclerosis: This is hardening of the arteries  Hardening occurs when fatty deposits, called plaque, build up along the walls of your arteries  · Abnormal development:  A part of your heart and aorta may not have developed normally  · Inflammation:  You may have inflammation in the walls of your aorta from an inflammatory disease or certain infections, such as syphilis  · Loss of elasticity:  As you age, blood vessels in the body may lose some of their elasticity  It may be worsened by long-term increased blood pressure  Certain disorders also can make the walls of your arteries lose elasticity  · Trauma:  Injury, particularly on the chest area, may lead to a partial or complete cut in the aorta  What increases my risk of a TAA? · Cigarette smoking    · High blood pressure    · A close family member has had a TAA    · Being male and older than 72years of age    · Diabetes     · Being overweight  What are the signs and symptoms of a TAA? You may have no signs or symptoms  The following are common when signs and symptoms do occur:  · Chest, neck, back, or abdominal pain    · Cough or blood in sputum    · Hoarseness    · Trouble breathing, which may be affected by position    · Trouble swallowing    · Wheezing  How is a TAA diagnosed? You may need more than one of the following tests  You may be given contrast dye before some tests to help the pictures show up better  Tell the healthcare provider if you have ever had an allergic reaction to contrast dye  · X-rays: These show your lungs, heart, aorta, and other parts around them   An x-ray of the aorta with dye is called an aortogram or aortography  · CT scan: This test is also called a CAT scan  An x-ray machine uses a computer to take pictures of your chest and blood vessels  The pictures may show a TAA  · MRI:  This scan uses powerful magnets and a computer to take pictures of your chest and blood vessels  An MRI may show a TAA  Do not enter the MRI room with anything metal  Metal can cause serious injury  Tell the healthcare provider if you have any metal in or on your body  · Transesophageal echocardiogram:        ¨ A transesophageal echocardiogram (ADAN) is a type of ultrasound that shows pictures of the size and shape of your heart  It also looks at how your heart moves when it is beating  These pictures are seen on a TV-like screen  You may need a ADAN if your heart does not show up very well in a regular echocardiogram  You may also need a ADAN to check for certain problems such as blood clots or infection inside the heart  ¨ You will be given medicine to relax you during a ADAN  Caregivers put a tube in your mouth that is moved down into your esophagus (food pipe)  The tube has a small ultrasound sensor on the end  Since your esophagus is right next to your heart, your caregiver can see your heart clearly  How is a TAA treated? · Medicines: You may be given blood pressure or cholesterol medicine to help stop your TAA from growing  · Repair:  Healthcare providers may cut out the aneurysm and replace the cut area with an artificial tube  If the aortic valve (flap) also has a problem, it may also be replaced  · Stenting:  A stent (tube) may be put in the portion of the aorta with aneurysm  The stent may strengthen your aorta and reduce pressure on the wall of your aorta  What are the risks of a TAA? Surgery may damage to your spinal cord, or cause you to bleed or get an infection  Too much blood loss can cause your organs to fail, such as your kidneys  You may need to have surgery again   If untreated, TAA may cause more serious problems  The aneurysm may get larger, burst, and be life-threatening  How can I manage my symptoms? · Check your blood pressure as directed:  Keep a record of your blood pressure and bring it with you to follow-up visits  Ask your healthcare provider what your blood pressure should be and how to check it  High blood pressure can make your aneurysm worse  · Exercise:  Ask your healthcare provider to help you create an exercise plan  This may help lower blood pressure  · Eat a variety of healthy foods:  Healthy foods include fruits, vegetables, whole-grain breads, low-fat dairy products, beans, lean meats, and fish  Ask if you need to be on a special diet  · Do not smoke: If you smoke, it is never too late to quit  Smoking increases your risk for TAA and heart disease  Ask your healthcare provider for information if you need help quitting  When should I contact my healthcare provider? · You have a fever  · You have new symptoms since your last appointment  · Your symptoms prevent you from doing your daily activities  · You have questions or concerns about your condition or care  When should I seek immediate care or call 911? · You have nausea and are vomiting  · You have sudden chest, neck, back, or abdominal pain  · Your skin becomes pale and sweaty, or you feel very weak  · You are dizzy, or you faint  CARE AGREEMENT:   You have the right to help plan your care  Learn about your health condition and how it may be treated  Discuss treatment options with your caregivers to decide what care you want to receive  You always have the right to refuse treatment  The above information is an  only  It is not intended as medical advice for individual conditions or treatments  Talk to your doctor, nurse or pharmacist before following any medical regimen to see if it is safe and effective for you    © 2017 [employer] Information is for End User's use only and may not be sold, redistributed or otherwise used for commercial purposes  All illustrations and images included in CareNotes® are the copyrighted property of A [employer] M [employer] Inc  or [employer]

## 2019-03-20 NOTE — PROGRESS NOTES
Cardiology Follow Up    Alesia Freeman  1950  [id number]     [address] [facility]  [address] [place] [address] [address]    1  Left ventricular hypertrophy  POCT ECG   2  Aneurysm, ascending aorta (HCC)     3  Mixed hyperlipidemia     4  Benign essential hypertension       Chief Complaint   Patient presents with    Follow-up     6 months       Interval History: Patient feels well, without complaints  No reported chest pain, shortness of breath, palpitations, lightheadedness, syncope, LE edema, orthopnea, PND, or significant weight changes  Patient remains active without any increased fatigue out of the ordinary  Has lost some weight with exercise and eating healthier          Patient Active Problem List   Diagnosis    Aneurysm, ascending aorta (HCC)    Aneurysm of abdominal aorta (HCC)    Benign essential hypertension    Erectile dysfunction    Left ventricular hypertrophy    Mixed hyperlipidemia    Thyroid nodule     Past Medical History:   Diagnosis Date    AAA (abdominal aortic aneurysm) (HCC)     Hypertension     Mixed hyperlipidemia     Nontoxic single thyroid nodule      Social History     Socioeconomic History    Marital status: /Civil Union     Spouse name: Not on file    Number of children: Not on file    Years of education: Not on file    Highest education level: Not on file   Occupational History    Not on file   Social Needs    Financial resource strain: Not on file    Food insecurity:     Worry: Not on file     Inability: Not on file    Transportation needs:     Medical: Not on file     Non-medical: Not on file   Tobacco Use    Smoking status: Former Smoker    Smokeless tobacco: Never Used    Tobacco comment: also currenst some day per [employer]   Substance and Sexual Activity    Alcohol use: No    Drug use: No    Sexual activity: Not on file   Lifestyle    Physical activity: Days per week: Not on file     Minutes per session: Not on file    Stress: Not on file   Relationships    Social connections:     Talks on phone: Not on file     Gets together: Not on file     Attends Jewish service: Not on file     Active member of club or organization: Not on file     Attends meetings of clubs or organizations: Not on file     Relationship status: Not on file    Intimate partner violence:     Fear of current or ex partner: Not on file     Emotionally abused: Not on file     Physically abused: Not on file     Forced sexual activity: Not on file   Other Topics Concern    Not on file   Social History Narrative    Not on file      Family History   Problem Relation Age of Onset    Heart attack Mother         MI    Sudden death Mother         SCD    Stroke Father         CVA    Heart attack Family         acute MI    Aneurysm Family         AAA    Cancer Family         gastric    Colon cancer Family     Liver cancer Family     Lung cancer Family     Prostate cancer Family     Stroke Family         syndrome     Past Surgical History:   Procedure Laterality Date    KNEE ARTHROSCOPY      therapeutic       Current Outpatient Medications:     amLODIPine-benazepril (LOTREL 5-20) 5-20 MG per capsule, Take 1 capsule by mouth daily, Disp: 90 capsule, Rfl: 3    aspirin (ECOTRIN LOW STRENGTH) 81 mg EC tablet, TAKE 1 TABLET BY MOUTH EVERY DAY, Disp: 90 tablet, Rfl: 2    Coenzyme Q10 (CO Q 10) 10 MG CAPS, Take 1 capsule by mouth 2 (two) times a day, Disp: , Rfl:     hydrochlorothiazide (HYDRODIURIL) 12 5 mg tablet, Take 1 tablet (12 5 mg total) by mouth daily, Disp: 90 tablet, Rfl: 2    metoprolol succinate (TOPROL-XL) 25 mg 24 hr tablet, Take 1 tablet (25 mg total) by mouth daily, Disp: 90 tablet, Rfl: 3    OMEGA-3 FATTY ACIDS PO, Take 1 capsule by mouth daily , Disp: , Rfl:     pravastatin (PRAVACHOL) 10 mg tablet, Take 1 tablet (10 mg total) by mouth daily, Disp: 90 tablet, Rfl: 3  No Known Allergies    Labs:  No visits with results within 6 Month(s) from this visit  Latest known visit with results is:   Orders Only on 06/01/2018   Component Date Value    Total Cholesterol 06/01/2018 145     HDL 06/01/2018 41     Triglycerides 06/01/2018 40     LDL Direct 06/01/2018 92     Chol HDLC Ratio 06/01/2018 3 5     Non-HDL Cholesterol 06/01/2018 104     Glucose, Random 06/01/2018 80     BUN 06/01/2018 23     Creatinine 06/01/2018 1 16     eGFR Non  06/01/2018 65     eGFR  06/01/2018 76     SL AMB BUN/CREATININE RA* 66/42/0670 NOT APPLICABLE     Sodium 66/65/9538 141     Potassium 06/01/2018 3 8     Chloride 06/01/2018 106     CO2 06/01/2018 28     SL AMB CALCIUM 06/01/2018 9 1     Protein, Total 06/01/2018 6 9     Albumin 06/01/2018 4 2     Globulin 06/01/2018 2 7     Albumin/Globulin Ratio 06/01/2018 1 6     TOTAL BILIRUBIN 06/01/2018 0 7     Alkaline Phosphatase 06/01/2018 51     AST 06/01/2018 20     ALT 06/01/2018 16      Lab Results   Component Value Date    CHOL 159 12/08/2017    TRIG 40 06/01/2018    HDL 41 06/01/2018    LDLDIRECT 121 11/21/2015     Imaging: Cta Chest Wo W Contrast    Result Date: 6/5/2018  Narrative: CT ANGIOGRAM OF THE CHEST, WITH AND WITHOUT IV CONTRAST INDICATION:  Aneurysm, follow-up COMPARISON: October 13, 2017 TECHNIQUE:  CT angiogram examination of the chest was performed according to standard protocol  Contrast as well as noncontrast images were obtained  This examination, like all CT scans performed in the [facility], was performed utilizing techniques to minimize radiation dose exposure, including the use of iterative reconstruction and automated exposure control  3D reconstructions were performed an independent workstation, and are supplied for review    Rad dose 1128 mGy-cm IV Contrast:  100 mL of iohexol (OMNIPAQUE)  FINDINGS: VASCULAR STRUCTURES:  There is a small aneurysm of the ascending aorta similar in size to October 2017  Maximal diameter and the mid ascending segment is measured at 44 mm  Proximal and distal ascending measurements are 41 mm and 39 mm, respectively  There is a traditional branching pattern of the aortic arch with no significant great vessel stenosis  Descending thoracic aorta is normal in caliber  There is mild atherosclerotic change  Mild ectasia is also seen in the juxtarenal aorta with eccentric mural plaque measuring 25 mm diameter  There is no significant celiac or SMA stenosis  Celiac artery is ectatic diffusely measuring 11 mm  No significant renal artery stenosis is seen  OTHER FINDINGS: HEART:  Normal cardiac size  No pericardial effusion  LUNGS:  Unremarkable  PLEURA:  No pleural effusion  MEDIASTINUM AND NIRMALA:  No mass or significant lymphadenopathy  CHEST WALL AND LOWER NECK:  Unremarkable  VISUALIZED STRUCTURES IN THE UPPER ABDOMEN:  Probable cysts left kidney  Smaller exophytic cyst involving the upper pole posteriorly measures slightly greater in attenuation than would be expected for simple cyst though no enhancement is seen compared to noncontrast images  Impression: 1  Stable 44 mm fusiform aneurysm of the ascending aorta not significant change since October 2017 2  There is a 1 3 cm sharply marginated cystic lesion in the upper pole left kidney  According to our institutional consensus and the recommendations of the [employer] ([address] [place] [address] [id number];8:130-011), this finding requires no followup  Additional 17 mm simple cyst is also present  Workstation performed: [id number]       Review of Systems:  Review of Systems   Constitutional: Negative for activity change, appetite change, fatigue and fever  HENT: Negative for nosebleeds and sore throat  Eyes: Negative for photophobia and visual disturbance  Respiratory: Negative for cough, chest tightness, shortness of breath and wheezing      Cardiovascular: Negative for chest pain, palpitations and leg swelling  Gastrointestinal: Negative for abdominal pain, diarrhea, nausea and vomiting  Endocrine: Negative for polyuria  Genitourinary: Negative for dysuria, frequency and hematuria  Musculoskeletal: Negative for arthralgias, back pain and gait problem  Skin: Negative for pallor and rash  Neurological: Negative for dizziness, syncope, speech difficulty and light-headedness  Hematological: Does not bruise/bleed easily  Psychiatric/Behavioral: Negative for agitation, behavioral problems and confusion  Physical Exam:  Physical Exam   Constitutional: He is oriented to person, place, and time  He appears well-developed and well-nourished  HENT:   Head: Normocephalic and atraumatic  Nose: Nose normal    Eyes: Pupils are equal, round, and reactive to light  EOM are normal  No scleral icterus  Neck: Normal range of motion  Neck supple  No JVD present  Cardiovascular: Normal rate, regular rhythm and normal heart sounds  Exam reveals no gallop and no friction rub  No murmur heard  Pulmonary/Chest: Effort normal and breath sounds normal  No respiratory distress  He has no wheezes  He has no rales  Abdominal: Soft  Bowel sounds are normal  He exhibits no distension  There is no tenderness  Musculoskeletal: Normal range of motion  He exhibits no edema or deformity  Neurological: He is alert and oriented to person, place, and time  No cranial nerve deficit  Skin: Skin is warm and dry  No rash noted  He is not diaphoretic  Psychiatric: He has a normal mood and affect  His behavior is normal    Vitals reviewed  Blood pressure 130/82, pulse 63, height 6' 7" (2 007 m), weight 99 3 kg (219 lb)  EKG:  Normal sinus rhythm  Left axis deviation  Nonspecific T wave abnormality  Abnormal ECG    Discussion/Summary:  Fusiform Ascending Aortic Aneurysm: measured at 4 4cm on Coronary CT in Oct 2017   Would continue to follow this for now, repeat testing in 6 months 6/2018 to assess for growth - revealing stable size at 4 4cm  Based on the fact that he has this, along with the LM CCS of 40, he would benefit from being on a baby ASA, as well as a B-blocker, and a statin to reduce cardiac risk and optimize medical therapy  Next evaluation at the [facility] in March 2019 revealed stable aneurysm at 4 4 cm - unchanged - can repeat testing now in 2 years  HTN: well controlled on current regimen  Continued on a B-blocker, which can reduce the shearing forces on his aneurysm  HLD: as noted above, would initiate a statin  Goal LDL would be <100, but in addition, we would use the statin for properties outside of cholesterol lowering, namely reduction of inflammation  Most recent eval in June 2018 revealed LDL of 92  Repeat testing revealed an LDL of 100 in Feb 2019

## 2019-05-23 DIAGNOSIS — I10 ESSENTIAL HYPERTENSION: ICD-10-CM

## 2019-05-23 RX ORDER — HYDROCHLOROTHIAZIDE 12.5 MG/1
TABLET ORAL
Qty: 90 TABLET | Refills: 2 | Status: SHIPPED | OUTPATIENT
Start: 2019-05-23 | End: 2020-03-09 | Stop reason: SDUPTHER

## 2019-06-24 DIAGNOSIS — E78.5 HYPERLIPIDEMIA, UNSPECIFIED HYPERLIPIDEMIA TYPE: ICD-10-CM

## 2019-06-24 DIAGNOSIS — I10 HYPERTENSION, UNSPECIFIED TYPE: ICD-10-CM

## 2019-06-28 RX ORDER — AMLODIPINE BESYLATE AND BENAZEPRIL HYDROCHLORIDE 5; 20 MG/1; MG/1
1 CAPSULE ORAL DAILY
Qty: 90 CAPSULE | Refills: 2 | Status: SHIPPED | OUTPATIENT
Start: 2019-06-28 | End: 2020-03-09 | Stop reason: SDUPTHER

## 2019-06-28 RX ORDER — METOPROLOL SUCCINATE 25 MG/1
TABLET, EXTENDED RELEASE ORAL
Qty: 90 TABLET | Refills: 2 | Status: SHIPPED | OUTPATIENT
Start: 2019-06-28 | End: 2020-03-09 | Stop reason: SDUPTHER

## 2019-06-28 RX ORDER — PRAVASTATIN SODIUM 10 MG
TABLET ORAL
Qty: 90 TABLET | Refills: 2 | Status: SHIPPED | OUTPATIENT
Start: 2019-06-28 | End: 2020-03-09 | Stop reason: SDUPTHER

## 2019-07-19 ENCOUNTER — TRANSCRIBE ORDERS (OUTPATIENT)
Dept: ADMINISTRATIVE | Facility: HOSPITAL | Age: 69
End: 2019-07-19

## 2019-07-19 DIAGNOSIS — E04.1 THYROID NODULE: Primary | ICD-10-CM

## 2019-07-24 ENCOUNTER — HOSPITAL ENCOUNTER (OUTPATIENT)
Dept: RADIOLOGY | Facility: MEDICAL CENTER | Age: 69
Discharge: HOME | End: 2019-07-24
Payer: COMMERCIAL

## 2019-07-24 DIAGNOSIS — E04.1 THYROID NODULE: ICD-10-CM

## 2019-07-24 PROCEDURE — 76536 US EXAM OF HEAD AND NECK: CPT

## 2020-03-09 ENCOUNTER — OFFICE VISIT (OUTPATIENT)
Dept: CARDIOLOGY CLINIC | Facility: MEDICAL CENTER | Age: 70
End: 2020-03-09
Payer: COMMERCIAL

## 2020-03-09 VITALS
SYSTOLIC BLOOD PRESSURE: 126 MMHG | BODY MASS INDEX: 25.72 KG/M2 | HEART RATE: 70 BPM | HEIGHT: 78 IN | OXYGEN SATURATION: 96 % | WEIGHT: 222.3 LBS | DIASTOLIC BLOOD PRESSURE: 78 MMHG

## 2020-03-09 DIAGNOSIS — E78.2 MIXED HYPERLIPIDEMIA: ICD-10-CM

## 2020-03-09 DIAGNOSIS — I10 BENIGN ESSENTIAL HYPERTENSION: ICD-10-CM

## 2020-03-09 DIAGNOSIS — I10 ESSENTIAL HYPERTENSION: ICD-10-CM

## 2020-03-09 DIAGNOSIS — E78.5 HYPERLIPIDEMIA, UNSPECIFIED HYPERLIPIDEMIA TYPE: ICD-10-CM

## 2020-03-09 DIAGNOSIS — I10 HYPERTENSION, UNSPECIFIED TYPE: ICD-10-CM

## 2020-03-09 DIAGNOSIS — I71.2 ANEURYSM, ASCENDING AORTA (HCC): Primary | ICD-10-CM

## 2020-03-09 PROCEDURE — 99214 OFFICE O/P EST MOD 30 MIN: CPT | Performed by: INTERNAL MEDICINE

## 2020-03-09 RX ORDER — PRAVASTATIN SODIUM 10 MG
10 TABLET ORAL DAILY
Qty: 90 TABLET | Refills: 3 | Status: SHIPPED | OUTPATIENT
Start: 2020-03-09 | End: 2021-06-23

## 2020-03-09 RX ORDER — METOPROLOL SUCCINATE 25 MG/1
25 TABLET, EXTENDED RELEASE ORAL DAILY
Qty: 90 TABLET | Refills: 3 | Status: SHIPPED | OUTPATIENT
Start: 2020-03-09 | End: 2021-03-04 | Stop reason: SDUPTHER

## 2020-03-09 RX ORDER — HYDROCHLOROTHIAZIDE 12.5 MG/1
12.5 TABLET ORAL DAILY
Qty: 90 TABLET | Refills: 3 | Status: SHIPPED | OUTPATIENT
Start: 2020-03-09 | End: 2021-03-04

## 2020-03-09 RX ORDER — AMLODIPINE BESYLATE AND BENAZEPRIL HYDROCHLORIDE 5; 20 MG/1; MG/1
1 CAPSULE ORAL DAILY
Qty: 90 CAPSULE | Refills: 3 | Status: SHIPPED | OUTPATIENT
Start: 2020-03-09 | End: 2021-02-17 | Stop reason: SDUPTHER

## 2020-03-09 NOTE — PROGRESS NOTES
Cardiology Follow Up    Jair Kansas  1950  [id number]   [address] [facility]  [address] [place] [address] [address]    1  Aneurysm, ascending aorta ([address] )     2  Hypertension, unspecified type  amLODIPine-benazepril (LOTREL 5-20) 5-20 MG per capsule    metoprolol succinate (TOPROL-XL) 25 mg 24 hr tablet   3  Essential hypertension  hydrochlorothiazide (HYDRODIURIL) 12 5 mg tablet   4  Hyperlipidemia, unspecified hyperlipidemia type  pravastatin (PRAVACHOL) 10 mg tablet   5  Mixed hyperlipidemia     6  Benign essential hypertension       Chief Complaint   Patient presents with    Follow-up     12 mo follow up no cardiac complaitns at this time  Interval History: Patient feels well, without complaints  No reported chest pain, shortness of breath, palpitations, lightheadedness, syncope, LE edema, orthopnea, PND, or significant weight changes  Patient remains active without any increased fatigue out of the ordinary        Patient Active Problem List   Diagnosis    Aneurysm, ascending aorta (HCC)    Aneurysm of abdominal aorta (HCC)    Benign essential hypertension    Erectile dysfunction    Left ventricular hypertrophy    Mixed hyperlipidemia    Thyroid nodule     Past Medical History:   Diagnosis Date    AAA (abdominal aortic aneurysm) (HCC)     Hypertension     Mixed hyperlipidemia     Nontoxic single thyroid nodule      Social History     Socioeconomic History    Marital status: /Civil Union     Spouse name: Not on file    Number of children: Not on file    Years of education: Not on file    Highest education level: Not on file   Occupational History    Not on file   Social Needs    Financial resource strain: Not on file    Food insecurity:     Worry: Not on file     Inability: Not on file    Transportation needs:     Medical: Not on file     Non-medical: Not on file   Tobacco Use    Smoking status: Former Smoker    Smokeless tobacco: Never Used    Tobacco comment: also currenst some day per [employer]   Substance and Sexual Activity    Alcohol use: No    Drug use: No    Sexual activity: Not on file   Lifestyle    Physical activity:     Days per week: Not on file     Minutes per session: Not on file    Stress: Not on file   Relationships    Social connections:     Talks on phone: Not on file     Gets together: Not on file     Attends Taoist service: Not on file     Active member of club or organization: Not on file     Attends meetings of clubs or organizations: Not on file     Relationship status: Not on file    Intimate partner violence:     Fear of current or ex partner: Not on file     Emotionally abused: Not on file     Physically abused: Not on file     Forced sexual activity: Not on file   Other Topics Concern    Not on file   Social History Narrative    Not on file      Family History   Problem Relation Age of Onset    Heart attack Mother         MI    Sudden death Mother         SCD    Stroke Father         CVA    Heart attack Family         acute MI    Aneurysm Family         AAA    Cancer Family         gastric    Colon cancer Family     Liver cancer Family     Lung cancer Family     Prostate cancer Family     Stroke Family         syndrome     Past Surgical History:   Procedure Laterality Date    KNEE ARTHROSCOPY      therapeutic       Current Outpatient Medications:     amLODIPine-benazepril (LOTREL 5-20) 5-20 MG per capsule, TAKE 1 CAPSULE BY MOUTH  DAILY, Disp: 90 capsule, Rfl: 2    aspirin (ECOTRIN LOW STRENGTH) 81 mg EC tablet, TAKE 1 TABLET BY MOUTH EVERY DAY, Disp: 90 tablet, Rfl: 2    Coenzyme Q10 (CO Q 10) 10 MG CAPS, Take 1 capsule by mouth 2 (two) times a day, Disp: , Rfl:     hydrochlorothiazide (HYDRODIURIL) 12 5 mg tablet, TAKE 1 TABLET BY MOUTH  DAILY, Disp: 90 tablet, Rfl: 2    metoprolol succinate (TOPROL-XL) 25 mg 24 hr tablet, TAKE 1 TABLET BY MOUTH  DAILY, Disp: 90 tablet, Rfl: 2    OMEGA-3 FATTY ACIDS PO, Take 1 capsule by mouth daily , Disp: , Rfl:     pravastatin (PRAVACHOL) 10 mg tablet, TAKE 1 TABLET BY MOUTH  DAILY, Disp: 90 tablet, Rfl: 2  No Known Allergies    Labs:  No visits with results within 6 Month(s) from this visit  Latest known visit with results is:   Orders Only on 06/01/2018   Component Date Value    Total Cholesterol 06/01/2018 145     HDL 06/01/2018 41     Triglycerides 06/01/2018 40     LDL Direct 06/01/2018 92     Chol HDLC Ratio 06/01/2018 3 5     Non-HDL Cholesterol 06/01/2018 104     Glucose, Random 06/01/2018 80     BUN 06/01/2018 23     Creatinine 06/01/2018 1 16     eGFR Non  06/01/2018 65     eGFR  06/01/2018 76     SL AMB BUN/CREATININE RA* 02/59/0246 NOT APPLICABLE     Sodium 09/16/5487 141     Potassium 06/01/2018 3 8     Chloride 06/01/2018 106     CO2 06/01/2018 28     Calcium 06/01/2018 9 1     Protein, Total 06/01/2018 6 9     Albumin 06/01/2018 4 2     Globulin 06/01/2018 2 7     Albumin/Globulin Ratio 06/01/2018 1 6     TOTAL BILIRUBIN 06/01/2018 0 7     Alkaline Phosphatase 06/01/2018 51     AST 06/01/2018 20     ALT 06/01/2018 16      Lab Results   Component Value Date    CHOL 159 12/08/2017    TRIG 40 06/01/2018    HDL 41 06/01/2018    LDLDIRECT 121 11/21/2015     Imaging: Cta Chest Wo W Contrast    Result Date: 6/5/2018  Narrative: CT ANGIOGRAM OF THE CHEST, WITH AND WITHOUT IV CONTRAST INDICATION:  Aneurysm, follow-up COMPARISON: October 13, 2017 TECHNIQUE:  CT angiogram examination of the chest was performed according to standard protocol  Contrast as well as noncontrast images were obtained  This examination, like all CT scans performed in the [facility], was performed utilizing techniques to minimize radiation dose exposure, including the use of iterative reconstruction and automated exposure control    3D reconstructions were performed an independent workstation, and are supplied for review  Rad dose 1128 mGy-cm IV Contrast:  100 mL of iohexol (OMNIPAQUE)  FINDINGS: VASCULAR STRUCTURES:  There is a small aneurysm of the ascending aorta similar in size to October 2017  Maximal diameter and the mid ascending segment is measured at 44 mm  Proximal and distal ascending measurements are 41 mm and 39 mm, respectively  There is a traditional branching pattern of the aortic arch with no significant great vessel stenosis  Descending thoracic aorta is normal in caliber  There is mild atherosclerotic change  Mild ectasia is also seen in the juxtarenal aorta with eccentric mural plaque measuring 25 mm diameter  There is no significant celiac or SMA stenosis  Celiac artery is ectatic diffusely measuring 11 mm  No significant renal artery stenosis is seen  OTHER FINDINGS: HEART:  Normal cardiac size  No pericardial effusion  LUNGS:  Unremarkable  PLEURA:  No pleural effusion  MEDIASTINUM AND NIRMALA:  No mass or significant lymphadenopathy  CHEST WALL AND LOWER NECK:  Unremarkable  VISUALIZED STRUCTURES IN THE UPPER ABDOMEN:  Probable cysts left kidney  Smaller exophytic cyst involving the upper pole posteriorly measures slightly greater in attenuation than would be expected for simple cyst though no enhancement is seen compared to noncontrast images  Impression: 1  Stable 44 mm fusiform aneurysm of the ascending aorta not significant change since October 2017 2  There is a 1 3 cm sharply marginated cystic lesion in the upper pole left kidney  According to our institutional consensus and the recommendations of the [employer] ([address] [address];6:990-695), this finding requires no followup  Additional 17 mm simple cyst is also present  Workstation performed: [id number]       Review of Systems:  Review of Systems   Constitutional: Negative for activity change, appetite change, fatigue and fever     HENT: Negative for nosebleeds and sore throat  Eyes: Negative for photophobia and visual disturbance  Respiratory: Negative for cough, chest tightness, shortness of breath and wheezing  Cardiovascular: Negative for chest pain, palpitations and leg swelling  Gastrointestinal: Negative for abdominal pain, diarrhea, nausea and vomiting  Endocrine: Negative for polyuria  Genitourinary: Negative for dysuria, frequency and hematuria  Musculoskeletal: Negative for arthralgias, back pain and gait problem  Skin: Negative for pallor and rash  Neurological: Negative for dizziness, syncope, speech difficulty and light-headedness  Hematological: Does not bruise/bleed easily  Psychiatric/Behavioral: Negative for agitation, behavioral problems and confusion  Physical Exam:  Physical Exam   Constitutional: He is oriented to person, place, and time  He appears well-developed and well-nourished  HENT:   Head: Normocephalic and atraumatic  Nose: Nose normal    Eyes: Pupils are equal, round, and reactive to light  EOM are normal  No scleral icterus  Neck: Normal range of motion  Neck supple  No JVD present  Cardiovascular: Normal rate, regular rhythm and normal heart sounds  Exam reveals no gallop and no friction rub  No murmur heard  Pulmonary/Chest: Effort normal and breath sounds normal  No respiratory distress  He has no wheezes  He has no rales  Abdominal: Soft  Bowel sounds are normal  He exhibits no distension  There is no tenderness  Musculoskeletal: Normal range of motion  He exhibits no edema or deformity  Neurological: He is alert and oriented to person, place, and time  No cranial nerve deficit  Skin: Skin is warm and dry  No rash noted  He is not diaphoretic  Psychiatric: He has a normal mood and affect  His behavior is normal    Vitals reviewed  Blood pressure 126/78, pulse 70, height 6' 7" (2 007 m), weight 101 kg (222 lb 4 8 oz), SpO2 96 %      EKG:  Normal sinus rhythm  Left axis deviation  Nonspecific T wave abnormality  Abnormal ECG    Discussion/Summary:  Fusiform Ascending Aortic Aneurysm: measured at 4 4cm on Coronary CT in Oct 2017  Would continue to follow this for now, repeat testing in 6 months 6/2018 to assess for growth - revealing stable size at 4 4cm  Based on the fact that he has this, along with the LM CCS of 40, he would benefit from being on a baby ASA, as well as a B-blocker, and a statin to reduce cardiac risk and optimize medical therapy  Next evaluation at the [facility] in March 2019 revealed stable aneurysm at 4 4 cm - unchanged - can repeat testing now in 2 years, which will be before the next visit in 2021  HTN: well controlled on current regimen  Continued on a B-blocker, which can reduce the shearing forces on his aneurysm  HLD: as noted above, would initiate a statin  Goal LDL would be <100, but in addition, we would use the statin for properties outside of cholesterol lowering, namely reduction of inflammation  Eval in June 2018 revealed LDL of 92  Repeat testing revealed an LDL of 100 in Feb 2019  Repeat in July 2019 revealed LDL of 107, which is stable

## 2020-03-09 NOTE — PATIENT INSTRUCTIONS

## 2020-04-27 ENCOUNTER — HOSPITAL ENCOUNTER (EMERGENCY)
Facility: HOSPITAL | Age: 70
Discharge: HOME | End: 2020-04-27
Attending: EMERGENCY MEDICINE | Admitting: EMERGENCY MEDICINE
Payer: COMMERCIAL

## 2020-04-27 VITALS
HEART RATE: 79 BPM | WEIGHT: 225 LBS | DIASTOLIC BLOOD PRESSURE: 76 MMHG | SYSTOLIC BLOOD PRESSURE: 150 MMHG | BODY MASS INDEX: 25.35 KG/M2 | OXYGEN SATURATION: 96 % | RESPIRATION RATE: 18 BRPM | TEMPERATURE: 97.6 F

## 2020-04-27 DIAGNOSIS — Z71.1 FEARED COMPLAINT WITHOUT DIAGNOSIS: Primary | ICD-10-CM

## 2020-04-27 PROCEDURE — 99281 EMR DPT VST MAYX REQ PHY/QHP: CPT | Performed by: EMERGENCY MEDICINE

## 2020-04-27 PROCEDURE — 99283 EMERGENCY DEPT VISIT LOW MDM: CPT

## 2021-02-13 DIAGNOSIS — Z23 ENCOUNTER FOR IMMUNIZATION: ICD-10-CM

## 2021-02-17 DIAGNOSIS — I10 HYPERTENSION, UNSPECIFIED TYPE: ICD-10-CM

## 2021-02-17 RX ORDER — AMLODIPINE BESYLATE AND BENAZEPRIL HYDROCHLORIDE 5; 20 MG/1; MG/1
1 CAPSULE ORAL DAILY
Qty: 90 CAPSULE | Refills: 3 | Status: SHIPPED | OUTPATIENT
Start: 2021-02-17 | End: 2021-05-13 | Stop reason: SDUPTHER

## 2021-03-04 DIAGNOSIS — I10 HYPERTENSION, UNSPECIFIED TYPE: ICD-10-CM

## 2021-03-04 DIAGNOSIS — I10 ESSENTIAL HYPERTENSION: ICD-10-CM

## 2021-03-04 RX ORDER — HYDROCHLOROTHIAZIDE 12.5 MG/1
12.5 TABLET ORAL DAILY
Qty: 90 TABLET | Refills: 3 | Status: SHIPPED | OUTPATIENT
Start: 2021-03-04 | End: 2022-04-11

## 2021-03-04 RX ORDER — METOPROLOL SUCCINATE 25 MG/1
25 TABLET, EXTENDED RELEASE ORAL DAILY
Qty: 90 TABLET | Refills: 3 | Status: SHIPPED | OUTPATIENT
Start: 2021-03-04 | End: 2022-05-02

## 2021-03-08 ENCOUNTER — IMMUNIZATIONS (OUTPATIENT)
Dept: FAMILY MEDICINE CLINIC | Facility: HOSPITAL | Age: 71
End: 2021-03-08

## 2021-03-08 DIAGNOSIS — Z23 ENCOUNTER FOR IMMUNIZATION: Primary | ICD-10-CM

## 2021-03-08 PROCEDURE — 0001A SARS-COV-2 / COVID-19 MRNA VACCINE (PFIZER-BIONTECH) 30 MCG: CPT

## 2021-03-08 PROCEDURE — 91300 SARS-COV-2 / COVID-19 MRNA VACCINE (PFIZER-BIONTECH) 30 MCG: CPT

## 2021-03-25 ENCOUNTER — TELEPHONE (OUTPATIENT)
Dept: CARDIOLOGY CLINIC | Facility: CLINIC | Age: 71
End: 2021-03-25

## 2021-03-25 DIAGNOSIS — I10 ESSENTIAL HYPERTENSION: Primary | ICD-10-CM

## 2021-03-25 NOTE — TELEPHONE ENCOUNTER
I faxed BMP order to [id number] [facility] @ [phone], at patient's request

## 2021-03-25 NOTE — TELEPHONE ENCOUNTER
BMP ordered ahead of CT of chest which is scheduled on Monday, 3/29  [place] [facility] called regarding need for blood work  I left a detailed message on pt's vm advising him of need to have blood work drawn prior to Monday, 3/29

## 2021-03-26 LAB
BUN SERPL-MCNC: 22 MG/DL (ref 7–25)
BUN/CREAT SERPL: NORMAL (CALC) (ref 6–22)
CALCIUM SERPL-MCNC: 9.6 MG/DL (ref 8.6–10.3)
CHLORIDE SERPL-SCNC: 104 MMOL/L (ref 98–110)
CO2 SERPL-SCNC: 29 MMOL/L (ref 20–32)
CREAT SERPL-MCNC: 1.06 MG/DL (ref 0.7–1.25)
GLUCOSE SERPL-MCNC: 95 MG/DL (ref 65–139)
POTASSIUM SERPL-SCNC: 3.9 MMOL/L (ref 3.5–5.3)
SL AMB EGFR AFRICAN AMERICAN: 83 ML/MIN/1.73M2
SL AMB EGFR NON AFRICAN AMERICAN: 72 ML/MIN/1.73M2
SODIUM SERPL-SCNC: 142 MMOL/L (ref 135–146)

## 2021-03-29 ENCOUNTER — HOSPITAL ENCOUNTER (OUTPATIENT)
Dept: RADIOLOGY | Facility: MEDICAL CENTER | Age: 71
Discharge: HOME | End: 2021-03-29
Payer: MEDICARE

## 2021-03-29 DIAGNOSIS — I71.2 ANEURYSM, ASCENDING AORTA (HCC): ICD-10-CM

## 2021-03-29 PROCEDURE — 71275 CT ANGIOGRAPHY CHEST: CPT

## 2021-03-29 RX ADMIN — IOHEXOL 100 ML: 350 INJECTION, SOLUTION INTRAVENOUS at 10:51

## 2021-04-02 ENCOUNTER — IMMUNIZATIONS (OUTPATIENT)
Dept: FAMILY MEDICINE CLINIC | Facility: HOSPITAL | Age: 71
End: 2021-04-02

## 2021-04-02 DIAGNOSIS — Z23 ENCOUNTER FOR IMMUNIZATION: Primary | ICD-10-CM

## 2021-04-02 PROCEDURE — 0002A SARS-COV-2 / COVID-19 MRNA VACCINE (PFIZER-BIONTECH) 30 MCG: CPT

## 2021-04-02 PROCEDURE — 91300 SARS-COV-2 / COVID-19 MRNA VACCINE (PFIZER-BIONTECH) 30 MCG: CPT

## 2021-05-06 ENCOUNTER — TELEPHONE (OUTPATIENT)
Dept: CARDIOLOGY CLINIC | Facility: CLINIC | Age: 71
End: 2021-05-06

## 2021-05-06 DIAGNOSIS — E78.2 MIXED HYPERLIPIDEMIA: Primary | ICD-10-CM

## 2021-05-07 LAB
CHOLEST SERPL-MCNC: 155 MG/DL
CHOLEST/HDLC SERPL: 5 (CALC)
HDLC SERPL-MCNC: 31 MG/DL
LDLC SERPL CALC-MCNC: 102 MG/DL (CALC)
NONHDLC SERPL-MCNC: 124 MG/DL (CALC)
TRIGL SERPL-MCNC: 119 MG/DL

## 2021-05-10 ENCOUNTER — OFFICE VISIT (OUTPATIENT)
Dept: CARDIOLOGY CLINIC | Facility: MEDICAL CENTER | Age: 71
End: 2021-05-10
Payer: MEDICARE

## 2021-05-10 VITALS
DIASTOLIC BLOOD PRESSURE: 84 MMHG | SYSTOLIC BLOOD PRESSURE: 124 MMHG | BODY MASS INDEX: 26.66 KG/M2 | WEIGHT: 230.4 LBS | HEART RATE: 80 BPM | OXYGEN SATURATION: 99 % | HEIGHT: 78 IN | TEMPERATURE: 96.7 F

## 2021-05-10 DIAGNOSIS — E78.2 MIXED HYPERLIPIDEMIA: ICD-10-CM

## 2021-05-10 DIAGNOSIS — I10 BENIGN ESSENTIAL HYPERTENSION: ICD-10-CM

## 2021-05-10 DIAGNOSIS — I71.2 ANEURYSM, ASCENDING AORTA (HCC): ICD-10-CM

## 2021-05-10 DIAGNOSIS — I51.7 LEFT VENTRICULAR HYPERTROPHY: ICD-10-CM

## 2021-05-10 DIAGNOSIS — I10 ESSENTIAL HYPERTENSION: Primary | ICD-10-CM

## 2021-05-10 PROCEDURE — 99214 OFFICE O/P EST MOD 30 MIN: CPT | Performed by: INTERNAL MEDICINE

## 2021-05-10 PROCEDURE — 93000 ELECTROCARDIOGRAM COMPLETE: CPT | Performed by: INTERNAL MEDICINE

## 2021-05-10 NOTE — PATIENT INSTRUCTIONS

## 2021-05-10 NOTE — PROGRESS NOTES
Cardiology Follow Up    Alexis Cabello  1950  [id number]   [address] [facility]  [address] [place] [address] [address]    1  Essential hypertension  POCT ECG   2  Left ventricular hypertrophy     3  Benign essential hypertension     4  Aneurysm, ascending aorta (HCC)     5  Mixed hyperlipidemia       Chief Complaint   Patient presents with    Follow-up     yearly check up  No cardiac symptoms  Interval History: Patient feels well, without complaints  No reported chest pain, shortness of breath, palpitations, lightheadedness, syncope, LE edema, orthopnea, PND, or significant weight changes  Patient remains active without any increased fatigue out of the ordinary         Patient Active Problem List   Diagnosis    Aneurysm, ascending aorta (HCC)    Aneurysm of abdominal aorta (HCC)    Benign essential hypertension    Erectile dysfunction    Left ventricular hypertrophy    Mixed hyperlipidemia    Thyroid nodule     Past Medical History:   Diagnosis Date    AAA (abdominal aortic aneurysm) (HCC)     Hypertension     Mixed hyperlipidemia     Nontoxic single thyroid nodule      Social History     Socioeconomic History    Marital status: /Civil Union     Spouse name: Not on file    Number of children: Not on file    Years of education: Not on file    Highest education level: Not on file   Occupational History    Not on file   Social Needs    Financial resource strain: Not on file    Food insecurity     Worry: Not on file     Inability: Not on file   [employer] needs     Medical: Not on file     Non-medical: Not on file   Tobacco Use    Smoking status: Former Smoker     Quit date:      Years since quittin 3    Smokeless tobacco: Former User    Tobacco comment: also corrinet some day per [employer]   Substance and Sexual Activity    Alcohol use: No    Drug use: No    Sexual activity: Not on file   Lifestyle    Physical activity     Days per week: Not on file     Minutes per session: Not on file    Stress: Not on file   Relationships    Social connections     Talks on phone: Not on file     Gets together: Not on file     Attends Denominational service: Not on file     Active member of club or organization: Not on file     Attends meetings of clubs or organizations: Not on file     Relationship status: Not on file    Intimate partner violence     Fear of current or ex partner: Not on file     Emotionally abused: Not on file     Physically abused: Not on file     Forced sexual activity: Not on file   Other Topics Concern    Not on file   Social History Narrative    Not on file      Family History   Problem Relation Age of Onset    Heart attack Mother         MI    Sudden death Mother         SCD    Stroke Father         CVA    Heart attack Family         acute MI    Aneurysm Family         AAA    Cancer Family         gastric    Colon cancer Family     Liver cancer Family     Lung cancer Family     Prostate cancer Family     Stroke Family         syndrome     Past Surgical History:   Procedure Laterality Date    KNEE ARTHROSCOPY      therapeutic       Current Outpatient Medications:     amLODIPine-benazepril (LOTREL 5-20) 5-20 MG per capsule, Take 1 capsule by mouth daily, Disp: 90 capsule, Rfl: 3    aspirin (ECOTRIN LOW STRENGTH) 81 mg EC tablet, TAKE 1 TABLET BY MOUTH EVERY DAY, Disp: 90 tablet, Rfl: 2    hydrochlorothiazide (HYDRODIURIL) 12 5 mg tablet, Take 1 tablet (12 5 mg total) by mouth daily, Disp: 90 tablet, Rfl: 3    metoprolol succinate (TOPROL-XL) 25 mg 24 hr tablet, Take 1 tablet (25 mg total) by mouth daily, Disp: 90 tablet, Rfl: 3    OMEGA-3 FATTY ACIDS PO, Take 1 capsule by mouth daily , Disp: , Rfl:     pravastatin (PRAVACHOL) 10 mg tablet, Take 1 tablet (10 mg total) by mouth daily, Disp: 90 tablet, Rfl: 3    Coenzyme Q10 (CO Q 10) 10 MG CAPS, Take 1 capsule by mouth 2 (two) times a day, Disp: , Rfl:   No Known Allergies    Labs:  Orders Only on 05/07/2021   Component Date Value    Total Cholesterol 05/07/2021 155     HDL 05/07/2021 31*    Triglycerides 05/07/2021 119     LDL Calculated 05/07/2021 102*    Chol HDLC Ratio 05/07/2021 5 0*    Non-HDL Cholesterol 05/07/2021 124    Orders Only on 03/25/2021   Component Date Value    Glucose, Random 03/25/2021 95     BUN 03/25/2021 22     Creatinine 03/25/2021 1 06     eGFR Non  03/25/2021 72     eGFR  03/25/2021 83     SL AMB BUN/CREATININE RA* 93/14/4091 NOT APPLICABLE     Sodium 73/02/0051 142     Potassium 03/25/2021 3 9     Chloride 03/25/2021 104     CO2 03/25/2021 29     Calcium 03/25/2021 9 6      Lab Results   Component Value Date    CHOL 159 12/08/2017    TRIG 119 05/07/2021    HDL 31 (L) 05/07/2021    LDLDIRECT 121 11/21/2015     Imaging: Cta Chest Wo W Contrast    Result Date: 6/5/2018  Narrative: CT ANGIOGRAM OF THE CHEST, WITH AND WITHOUT IV CONTRAST INDICATION:  Aneurysm, follow-up COMPARISON: October 13, 2017 TECHNIQUE:  CT angiogram examination of the chest was performed according to standard protocol  Contrast as well as noncontrast images were obtained  This examination, like all CT scans performed in the [facility], was performed utilizing techniques to minimize radiation dose exposure, including the use of iterative reconstruction and automated exposure control  3D reconstructions were performed an independent workstation, and are supplied for review  Rad dose 1128 mGy-cm IV Contrast:  100 mL of iohexol (OMNIPAQUE)  FINDINGS: VASCULAR STRUCTURES:  There is a small aneurysm of the ascending aorta similar in size to October 2017  Maximal diameter and the mid ascending segment is measured at 44 mm  Proximal and distal ascending measurements are 41 mm and 39 mm, respectively    There is a traditional branching pattern of the aortic arch with no significant great vessel stenosis  Descending thoracic aorta is normal in caliber  There is mild atherosclerotic change  Mild ectasia is also seen in the juxtarenal aorta with eccentric mural plaque measuring 25 mm diameter  There is no significant celiac or SMA stenosis  Celiac artery is ectatic diffusely measuring 11 mm  No significant renal artery stenosis is seen  OTHER FINDINGS: HEART:  Normal cardiac size  No pericardial effusion  LUNGS:  Unremarkable  PLEURA:  No pleural effusion  MEDIASTINUM AND NIRMALA:  No mass or significant lymphadenopathy  CHEST WALL AND LOWER NECK:  Unremarkable  VISUALIZED STRUCTURES IN THE UPPER ABDOMEN:  Probable cysts left kidney  Smaller exophytic cyst involving the upper pole posteriorly measures slightly greater in attenuation than would be expected for simple cyst though no enhancement is seen compared to noncontrast images  Impression: 1  Stable 44 mm fusiform aneurysm of the ascending aorta not significant change since October 2017 2  There is a 1 3 cm sharply marginated cystic lesion in the upper pole left kidney  According to our institutional consensus and the recommendations of the [employer] of Radiology ([address] [place] [address];6:[id number]), this finding requires no followup  Additional 17 mm simple cyst is also present  Workstation performed: [id number]       Review of Systems:  Review of Systems   Constitutional: Negative for activity change, appetite change, fatigue and fever  HENT: Negative for nosebleeds and sore throat  Eyes: Negative for photophobia and visual disturbance  Respiratory: Negative for cough, chest tightness, shortness of breath and wheezing  Cardiovascular: Negative for chest pain, palpitations and leg swelling  Gastrointestinal: Negative for abdominal pain, diarrhea, nausea and vomiting  Endocrine: Negative for polyuria  Genitourinary: Negative for dysuria, frequency and hematuria     Musculoskeletal: Negative for arthralgias, back pain and gait problem  Skin: Negative for pallor and rash  Neurological: Negative for dizziness, syncope, speech difficulty and light-headedness  Hematological: Does not bruise/bleed easily  Psychiatric/Behavioral: Negative for agitation, behavioral problems and confusion  Physical Exam:  Physical Exam   Constitutional: He is oriented to person, place, and time  He appears well-developed and well-nourished  No distress  HENT:   Head: Normocephalic and atraumatic  Nose: Nose normal    Eyes: Pupils are equal, round, and reactive to light  EOM are normal  No scleral icterus  Neck: Normal range of motion  Neck supple  No JVD present  Cardiovascular: Normal rate, regular rhythm, S1 normal and S2 normal  Exam reveals no gallop, no S3, no distant heart sounds and no friction rub  No murmur heard  No systolic murmur is present  Pulmonary/Chest: Effort normal and breath sounds normal  No respiratory distress  He has no wheezes  He has no rales  Abdominal: Soft  Bowel sounds are normal  He exhibits no distension  Musculoskeletal:         General: No deformity or edema  Neurological: He is alert and oriented to person, place, and time  No cranial nerve deficit  Skin: Skin is warm and dry  He is not diaphoretic  No erythema  Psychiatric: He has a normal mood and affect  His behavior is normal    Vitals reviewed  Blood pressure 124/84, pulse 80, temperature (!) 96 7 °F (35 9 °C), height 6' 7" (2 007 m), weight 105 kg (230 lb 6 4 oz), SpO2 99 %  EKG:  Normal sinus rhythm  Left anterior fascicular block  Abnormal ECG    Discussion/Summary:  Fusiform Ascending Aortic Aneurysm: measured at 4 4cm on Coronary CT in Oct 2017  Would continue to follow this for now, repeat testing in 6 months 6/2018 to assess for growth - revealing stable size at 4 4cm   Based on the fact that he has this, along with the LM CCS of 40, he would benefit from being on a baby ASA, as well as a B-blocker, and a statin to reduce cardiac risk and optimize medical therapy  Next evaluation at the [facility] in March 2019 revealed stable aneurysm at 4 4 cm, remains unchanged on repeat testing in 2 years, in March 2021  Next evaluation due in 5 years (2026)  HTN: well controlled on current regimen  Continued on a B-blocker, which can reduce the shearing forces on his aneurysm  HLD: as noted above, would initiate a statin  Goal LDL would be <100, but in addition, we would use the statin for properties outside of cholesterol lowering, namely reduction of inflammation  Eval in June 2018 revealed LDL of 92  Repeat testing revealed an LDL of 100 in Feb 2019  Repeat in July 2019 revealed LDL of 107,  in May 2021, which is stable

## 2021-05-13 DIAGNOSIS — I10 HYPERTENSION, UNSPECIFIED TYPE: ICD-10-CM

## 2021-05-13 RX ORDER — AMLODIPINE BESYLATE AND BENAZEPRIL HYDROCHLORIDE 5; 20 MG/1; MG/1
1 CAPSULE ORAL DAILY
Qty: 90 CAPSULE | Refills: 3 | Status: SHIPPED | OUTPATIENT
Start: 2021-05-13 | End: 2022-05-02

## 2021-06-23 DIAGNOSIS — E78.5 HYPERLIPIDEMIA, UNSPECIFIED HYPERLIPIDEMIA TYPE: ICD-10-CM

## 2021-06-23 RX ORDER — PRAVASTATIN SODIUM 10 MG
10 TABLET ORAL DAILY
Qty: 90 TABLET | Refills: 3 | Status: SHIPPED | OUTPATIENT
Start: 2021-06-23

## 2021-09-14 ENCOUNTER — HOSPITAL ENCOUNTER (OUTPATIENT)
Dept: RADIOLOGY | Facility: MEDICAL CENTER | Age: 71
Discharge: HOME | End: 2021-09-14
Payer: MEDICARE

## 2021-09-14 DIAGNOSIS — E04.1 THYROID NODULE: ICD-10-CM

## 2021-09-14 PROCEDURE — 76536 US EXAM OF HEAD AND NECK: CPT

## 2021-10-13 ENCOUNTER — TELEPHONE (OUTPATIENT)
Dept: GASTROENTEROLOGY | Facility: CLINIC | Age: 71
End: 2021-10-13

## 2021-12-16 ENCOUNTER — ANESTHESIA (OUTPATIENT)
Dept: GASTROENTEROLOGY | Facility: AMBULATORY SURGERY CENTER | Age: 71
End: 2021-12-16

## 2021-12-16 ENCOUNTER — HOSPITAL ENCOUNTER (OUTPATIENT)
Dept: GASTROENTEROLOGY | Facility: AMBULATORY SURGERY CENTER | Age: 71
Discharge: HOME | End: 2021-12-16
Payer: MEDICARE

## 2021-12-16 ENCOUNTER — ANESTHESIA EVENT (OUTPATIENT)
Dept: GASTROENTEROLOGY | Facility: AMBULATORY SURGERY CENTER | Age: 71
End: 2021-12-16

## 2021-12-16 VITALS
HEIGHT: 78 IN | TEMPERATURE: 97.7 F | WEIGHT: 230 LBS | BODY MASS INDEX: 26.61 KG/M2 | HEART RATE: 73 BPM | OXYGEN SATURATION: 97 % | RESPIRATION RATE: 18 BRPM | DIASTOLIC BLOOD PRESSURE: 71 MMHG | SYSTOLIC BLOOD PRESSURE: 111 MMHG

## 2021-12-16 DIAGNOSIS — K59.00 CONSTIPATION, UNSPECIFIED CONSTIPATION TYPE: ICD-10-CM

## 2021-12-16 DIAGNOSIS — Q43.8 REDUNDANT COLON: ICD-10-CM

## 2021-12-16 PROCEDURE — 45380 COLONOSCOPY AND BIOPSY: CPT | Performed by: INTERNAL MEDICINE

## 2021-12-16 PROCEDURE — 88305 TISSUE EXAM BY PATHOLOGIST: CPT | Performed by: SPECIALIST

## 2021-12-16 PROCEDURE — 00811 ANES LWR INTST NDSC NOS: CPT | Performed by: NURSE ANESTHETIST, CERTIFIED REGISTERED

## 2021-12-16 RX ORDER — SODIUM CHLORIDE 9 MG/ML
30 INJECTION, SOLUTION INTRAVENOUS CONTINUOUS
Status: DISCONTINUED | OUTPATIENT
Start: 2021-12-16 | End: 2021-12-20 | Stop reason: HOSPADM

## 2021-12-16 RX ORDER — GLYCOPYRROLATE 0.2 MG/ML
INJECTION INTRAMUSCULAR; INTRAVENOUS AS NEEDED
Status: DISCONTINUED | OUTPATIENT
Start: 2021-12-16 | End: 2021-12-16

## 2021-12-16 RX ORDER — DIPHENOXYLATE HYDROCHLORIDE AND ATROPINE SULFATE 2.5; .025 MG/1; MG/1
1 TABLET ORAL DAILY
COMMUNITY

## 2021-12-16 RX ORDER — PROPOFOL 10 MG/ML
INJECTION, EMULSION INTRAVENOUS AS NEEDED
Status: DISCONTINUED | OUTPATIENT
Start: 2021-12-16 | End: 2021-12-16

## 2021-12-16 RX ORDER — SODIUM CHLORIDE 9 MG/ML
20 INJECTION, SOLUTION INTRAVENOUS CONTINUOUS
Status: DISCONTINUED | OUTPATIENT
Start: 2021-12-16 | End: 2021-12-20 | Stop reason: HOSPADM

## 2021-12-16 RX ORDER — EPHEDRINE SULFATE 50 MG/ML
INJECTION INTRAVENOUS AS NEEDED
Status: DISCONTINUED | OUTPATIENT
Start: 2021-12-16 | End: 2021-12-16

## 2021-12-16 RX ADMIN — PROPOFOL 30 MG: 10 INJECTION, EMULSION INTRAVENOUS at 11:23

## 2021-12-16 RX ADMIN — EPHEDRINE SULFATE 5 MG: 50 INJECTION INTRAVENOUS at 11:26

## 2021-12-16 RX ADMIN — EPHEDRINE SULFATE 10 MG: 50 INJECTION INTRAVENOUS at 11:37

## 2021-12-16 RX ADMIN — SODIUM CHLORIDE: 9 INJECTION, SOLUTION INTRAVENOUS at 11:05

## 2021-12-16 RX ADMIN — EPHEDRINE SULFATE 5 MG: 50 INJECTION INTRAVENOUS at 11:31

## 2021-12-16 RX ADMIN — EPHEDRINE SULFATE 5 MG: 50 INJECTION INTRAVENOUS at 11:27

## 2021-12-16 RX ADMIN — EPHEDRINE SULFATE 5 MG: 50 INJECTION INTRAVENOUS at 11:24

## 2021-12-16 RX ADMIN — PROPOFOL 30 MG: 10 INJECTION, EMULSION INTRAVENOUS at 11:14

## 2021-12-16 RX ADMIN — PROPOFOL 20 MG: 10 INJECTION, EMULSION INTRAVENOUS at 11:26

## 2021-12-16 RX ADMIN — PROPOFOL 20 MG: 10 INJECTION, EMULSION INTRAVENOUS at 11:29

## 2021-12-16 RX ADMIN — PROPOFOL 20 MG: 10 INJECTION, EMULSION INTRAVENOUS at 11:19

## 2021-12-16 RX ADMIN — PROPOFOL 30 MG: 10 INJECTION, EMULSION INTRAVENOUS at 11:27

## 2021-12-16 RX ADMIN — PROPOFOL 150 MG: 10 INJECTION, EMULSION INTRAVENOUS at 11:08

## 2021-12-16 RX ADMIN — PROPOFOL 20 MG: 10 INJECTION, EMULSION INTRAVENOUS at 11:34

## 2021-12-16 RX ADMIN — PROPOFOL 30 MG: 10 INJECTION, EMULSION INTRAVENOUS at 11:32

## 2021-12-16 RX ADMIN — GLYCOPYRROLATE 0.1 MG: 0.2 INJECTION INTRAMUSCULAR; INTRAVENOUS at 11:12

## 2021-12-16 RX ADMIN — PROPOFOL 50 MG: 10 INJECTION, EMULSION INTRAVENOUS at 11:10

## 2021-12-16 RX ADMIN — EPHEDRINE SULFATE 10 MG: 50 INJECTION INTRAVENOUS at 11:22

## 2021-12-16 RX ADMIN — EPHEDRINE SULFATE 10 MG: 50 INJECTION INTRAVENOUS at 11:44

## 2022-04-09 DIAGNOSIS — I10 ESSENTIAL HYPERTENSION: ICD-10-CM

## 2022-04-11 RX ORDER — HYDROCHLOROTHIAZIDE 12.5 MG/1
TABLET ORAL
Qty: 90 TABLET | Refills: 0 | Status: SHIPPED | OUTPATIENT
Start: 2022-04-11 | End: 2022-07-08

## 2022-04-11 NOTE — TELEPHONE ENCOUNTER
Requested medication(s) are due for refill today: Yes  Patient has already received a courtesy refill: No  Other reason request has been forwarded to provider:   Cardiovascular: Diuretics - Thiazide Failed 04/09/2022 02:08 AM   Protocol Details  Ca in normal range and within 360 days    Cr in normal range and within 360 days    K in normal range and within 360 days    Na in normal range and within 360 days    Valid encounter within last 6 months

## 2022-04-20 ENCOUNTER — OFFICE VISIT (OUTPATIENT)
Dept: CARDIOLOGY CLINIC | Facility: MEDICAL CENTER | Age: 72
End: 2022-04-20
Payer: MEDICARE

## 2022-04-20 VITALS
WEIGHT: 225 LBS | DIASTOLIC BLOOD PRESSURE: 72 MMHG | OXYGEN SATURATION: 96 % | SYSTOLIC BLOOD PRESSURE: 110 MMHG | BODY MASS INDEX: 26.03 KG/M2 | HEART RATE: 96 BPM | HEIGHT: 78 IN

## 2022-04-20 DIAGNOSIS — I71.2 ANEURYSM, ASCENDING AORTA (HCC): Primary | ICD-10-CM

## 2022-04-20 DIAGNOSIS — I10 BENIGN ESSENTIAL HYPERTENSION: ICD-10-CM

## 2022-04-20 DIAGNOSIS — E78.2 MIXED HYPERLIPIDEMIA: ICD-10-CM

## 2022-04-20 DIAGNOSIS — I51.7 LEFT VENTRICULAR HYPERTROPHY: ICD-10-CM

## 2022-04-20 PROCEDURE — 99214 OFFICE O/P EST MOD 30 MIN: CPT | Performed by: INTERNAL MEDICINE

## 2022-04-20 PROCEDURE — 1123F ACP DISCUSS/DSCN MKR DOCD: CPT | Performed by: INTERNAL MEDICINE

## 2022-04-20 NOTE — PROGRESS NOTES
Cardiology Follow Up    Onel Santos  1950  [id number]   [address] [facility]  [address] [place] [address] [address]    1  Aneurysm, ascending aorta ([address] )     2  Benign essential hypertension     3  Left ventricular hypertrophy     4  Mixed hyperlipidemia       Chief Complaint   Patient presents with    Follow-up     12 month f/up     Interval History: Patient feels well, without complaints  No reported chest pain, shortness of breath, palpitations, lightheadedness, syncope, LE edema, orthopnea, PND, or significant weight changes  Patient remains active without any increased fatigue out of the ordinary        Patient Active Problem List   Diagnosis    Aneurysm, ascending aorta (HCC)    Aneurysm of abdominal aorta (HCC)    Benign essential hypertension    Erectile dysfunction    Left ventricular hypertrophy    Mixed hyperlipidemia    Thyroid nodule    Arthritis     Past Medical History:   Diagnosis Date    AAA (abdominal aortic aneurysm) (HCC)     Arthritis     wrist and knees    Hypertension     Mixed hyperlipidemia     Nontoxic single thyroid nodule      Social History     Socioeconomic History    Marital status: /Civil Union     Spouse name: Not on file    Number of children: Not on file    Years of education: Not on file    Highest education level: Not on file   Occupational History    Not on file   Tobacco Use    Smoking status: Former Smoker     Packs/day: 0 50     Years: 25 00     Pack years: 12 50     Types: Cigarettes     Quit date:      Years since quittin 3    Smokeless tobacco: Former User   Vaping Use    Vaping Use: Never used   Substance and Sexual Activity    Alcohol use: Not Currently     Comment: social    Drug use: No    Sexual activity: Not on file   Other Topics Concern    Not on file   Social History Narrative    Not on file     Social Determinants of Health Financial Resource Strain: Not on file   Food Insecurity: Not on file   Transportation Needs: Not on file   Physical Activity: Not on file   Stress: Not on file   Social Connections: Not on file   Intimate Partner Violence: Not on file   Housing Stability: Not on file      Family History   Problem Relation Age of Onset    Heart attack Mother         MI    Sudden death Mother         SCD    Stroke Father         CVA    Heart attack Family         acute MI    Aneurysm Family         AAA    Cancer Family         gastric    Colon cancer Family     Liver cancer Family     Lung cancer Family     Prostate cancer Family     Stroke Family         syndrome     Past Surgical History:   Procedure Laterality Date    COLONOSCOPY      KNEE ARTHROSCOPY      therapeutic    VASECTOMY         Current Outpatient Medications:     amLODIPine-benazepril (LOTREL 5-20) 5-20 MG per capsule, Take 1 capsule by mouth daily, Disp: 90 capsule, Rfl: 3    aspirin (ECOTRIN LOW STRENGTH) 81 mg EC tablet, TAKE 1 TABLET BY MOUTH EVERY DAY, Disp: 90 tablet, Rfl: 2    Coenzyme Q10 (CO Q 10) 10 MG CAPS, Take 1 capsule by mouth 2 (two) times a day, Disp: , Rfl:     hydrochlorothiazide (HYDRODIURIL) 12 5 mg tablet, TAKE 1 TABLET DAILY, Disp: 90 tablet, Rfl: 0    metoprolol succinate (TOPROL-XL) 25 mg 24 hr tablet, Take 1 tablet (25 mg total) by mouth daily, Disp: 90 tablet, Rfl: 3    multivitamin (THERAGRAN) TABS, Take 1 tablet by mouth daily, Disp: , Rfl:     OMEGA-3 FATTY ACIDS PO, Take 1 capsule by mouth daily , Disp: , Rfl:     Red Yeast Rice Extract (RED YEAST RICE PO), Take by mouth, Disp: , Rfl:     pravastatin (PRAVACHOL) 10 mg tablet, Take 1 tablet (10 mg total) by mouth daily (Patient not taking: Reported on 4/20/2022 ), Disp: 90 tablet, Rfl: 3  No Known Allergies    Labs:  Hospital Outpatient Visit on 12/16/2021   Component Date Value    Case Report 12/16/2021                      Value:Surgical Pathology Report Case: [id number]                                   Authorizing Provider:  Susanna Black MD Collected:           12/16/2021 1126              Ordering Location:     [address] [place] [address] Received:            12/16/2021 [address] [place] [address]                                                                  Pathologist:           Margarita Webster MD                                                     Specimens:   A) - Large Intestine, Cecum, cold bx cecum polyp                                                    B) - Colon, cold bx right colon melanosis coli                                                      C) - Large Intestine, Transverse Colon, cold bx transverse polyp                           Final Diagnosis 12/16/2021                      Value: This result contains rich text formatting which cannot be displayed here   Additional Information 12/16/2021                      Value: This result contains rich text formatting which cannot be displayed here   Synoptic Checklist 12/16/2021                      Value:                            COLON/RECTUM POLYP FORM - GI - C                                                                                     :    Adenoma(s)      Gross Description 12/16/2021                      Value: This result contains rich text formatting which cannot be displayed here  Lab Results   Component Value Date    CHOL 159 12/08/2017    TRIG 119 05/07/2021    HDL 31 (L) 05/07/2021    LDLDIRECT 121 11/21/2015     Imaging: Cta Chest Wo W Contrast    Result Date: 6/5/2018  Narrative: CT ANGIOGRAM OF THE CHEST, WITH AND WITHOUT IV CONTRAST INDICATION:  Aneurysm, follow-up COMPARISON: October 13, 2017 TECHNIQUE:  CT angiogram examination of the chest was performed according to standard protocol  Contrast as well as noncontrast images were obtained     This examination, like all CT scans performed in the [facility], was performed utilizing techniques to minimize radiation dose exposure, including the use of iterative reconstruction and automated exposure control  3D reconstructions were performed an independent workstation, and are supplied for review  Rad dose 1128 mGy-cm IV Contrast:  100 mL of iohexol (OMNIPAQUE)  FINDINGS: VASCULAR STRUCTURES:  There is a small aneurysm of the ascending aorta similar in size to October 2017  Maximal diameter and the mid ascending segment is measured at 44 mm  Proximal and distal ascending measurements are 41 mm and 39 mm, respectively  There is a traditional branching pattern of the aortic arch with no significant great vessel stenosis  Descending thoracic aorta is normal in caliber  There is mild atherosclerotic change  Mild ectasia is also seen in the juxtarenal aorta with eccentric mural plaque measuring 25 mm diameter  There is no significant celiac or SMA stenosis  Celiac artery is ectatic diffusely measuring 11 mm  No significant renal artery stenosis is seen  OTHER FINDINGS: HEART:  Normal cardiac size  No pericardial effusion  LUNGS:  Unremarkable  PLEURA:  No pleural effusion  MEDIASTINUM AND NIRMALA:  No mass or significant lymphadenopathy  CHEST WALL AND LOWER NECK:  Unremarkable  VISUALIZED STRUCTURES IN THE UPPER ABDOMEN:  Probable cysts left kidney  Smaller exophytic cyst involving the upper pole posteriorly measures slightly greater in attenuation than would be expected for simple cyst though no enhancement is seen compared to noncontrast images  Impression: 1  Stable 44 mm fusiform aneurysm of the ascending aorta not significant change since October 2017 2  There is a 1 3 cm sharply marginated cystic lesion in the upper pole left kidney  According to our institutional consensus and the recommendations of the [address] [place] [address] St of Radiology ([address] [place] [address];3:404-304), this finding requires no followup   Additional 17 mm simple cyst is also present  Workstation performed: [id number]       Review of Systems:  Review of Systems   Constitutional: Negative for activity change, appetite change, fatigue and fever  HENT: Negative for nosebleeds and sore throat  Eyes: Negative for photophobia and visual disturbance  Respiratory: Negative for cough, chest tightness, shortness of breath and wheezing  Cardiovascular: Negative for chest pain, palpitations and leg swelling  Gastrointestinal: Negative for abdominal pain, diarrhea, nausea and vomiting  Endocrine: Negative for polyuria  Genitourinary: Negative for dysuria, frequency and hematuria  Musculoskeletal: Negative for arthralgias, back pain and gait problem  Skin: Negative for pallor and rash  Neurological: Negative for dizziness, syncope, speech difficulty and light-headedness  Hematological: Does not bruise/bleed easily  Psychiatric/Behavioral: Negative for agitation, behavioral problems and confusion  Physical Exam:  Physical Exam  Vitals reviewed  Constitutional:       General: He is not in acute distress  Appearance: He is well-developed  He is not diaphoretic  HENT:      Head: Normocephalic and atraumatic  Nose: Nose normal    Eyes:      General: No scleral icterus  Pupils: Pupils are equal, round, and reactive to light  Neck:      Vascular: No JVD  Cardiovascular:      Rate and Rhythm: Normal rate and regular rhythm  Heart sounds: S1 normal and S2 normal  Heart sounds not distant  No murmur heard  No systolic murmur is present  No friction rub  No gallop  No S3 sounds  Pulmonary:      Effort: Pulmonary effort is normal  No respiratory distress  Breath sounds: Normal breath sounds  No wheezing or rales  Abdominal:      General: Bowel sounds are normal  There is no distension  Palpations: Abdomen is soft  Musculoskeletal:         General: No deformity  Cervical back: Normal range of motion and neck supple     Skin: General: Skin is warm and dry  Findings: No erythema  Neurological:      Mental Status: He is alert and oriented to person, place, and time  Cranial Nerves: No cranial nerve deficit  Psychiatric:         Behavior: Behavior normal        Blood pressure 110/72, pulse 96, height 6' 7" (2 007 m), weight 102 kg (225 lb), SpO2 96 %  EKG:  Normal sinus rhythm  Left anterior fascicular block  Abnormal ECG    Discussion/Summary:  Fusiform Ascending Aortic Aneurysm: measured at 4 4cm on Coronary CT in Oct 2017  Would continue to follow this for now, repeat testing in 6 months 6/2018 to assess for growth - revealing stable size at 4 4cm  Based on the fact that he has this, along with the LM CCS of 40, he would benefit from being on a baby ASA, as well as a B-blocker, and a statin to reduce cardiac risk and optimize medical therapy  Next evaluation at the [facility] in March 2019 revealed stable aneurysm at 4 4 cm, remains unchanged on repeat testing in 2 years, in March 2021  Next evaluation due in 5 years (2026)  Stable and asymptomatic  HTN: well controlled on current regimen of Lotrel, B-blocker, and HCTZ  Continued on a B-blocker, which can reduce the shearing forces on his aneurysm  Continue current regimen  HLD: as noted above, would initiate a statin  Goal LDL would be <100, but in addition, we would use the statin for properties outside of cholesterol lowering, namely reduction of inflammation  Eval in June 2018 revealed LDL of 92  Repeat testing revealed an LDL of 100 in Feb 2019  Repeat in July 2019 revealed LDL of 107,  in May 2021, which is stable  Repeat for this year revealed LDL of 99 - currently off statin

## 2022-04-20 NOTE — PATIENT INSTRUCTIONS
Hyperlipidemia   AMBULATORY CARE:   Hyperlipidemia  is a high level of lipids (fats) in your blood  These lipids include cholesterol or triglycerides  Lipids are made by your body  They also come from the foods you eat  Your body needs lipids to work properly, but high levels increase your risk for heart disease, heart attack, and stroke  Call your local emergency number [phone] in the [address] [facility], located within [facility]) or have someone call if:   · You have any of the following signs of a heart attack:      ? Squeezing, pressure, or pain in your chest    ? You may  also have any of the following:     § Discomfort or pain in your back, neck, jaw, stomach, or arm    § Shortness of breath    § Nausea or vomiting    § Lightheadedness or a sudden cold sweat    · You have any of the following signs of a stroke:      ? Numbness or drooping on one side of your face     ? Weakness in an arm or leg    ? Confusion or difficulty speaking    ? Dizziness, a severe headache, or vision loss    Call your doctor if:   · You have questions or concerns about your condition or care  Treatment  may first include lifestyle changes to help decrease your lipid levels  Your provider may recommend you work with a team to manage hyperlipidemia  The team may include medical experts such as a dietitian, an exercise or physical therapist, and a behavior therapist  Your family members may be included in helping you create lifestyle changes  You may also need to take medicine to lower your lipid levels  Some of the lifestyle changes you may need to make include the following:  · Maintain a healthy weight  Ask your healthcare provider what a healthy weight is for you  Ask him or her to help you create a weight loss plan if you are overweight  Weight loss can decrease your cholesterol and triglyceride levels  · Be physically active throughout the day  Physical activity, such as exercise, lowers your cholesterol levels and helps you maintain a healthy weight   Get 30 minutes or more of aerobic exercise 4 to 6 days each week  You can split your exercise into four 10-minute workouts instead of 30 minutes at one time  Examples of aerobic exercises include walking briskly, swimming, or riding a bike  Work with your healthcare provider to plan the best exercise program for you  Also include strength training at least 2 times each week  Your healthcare providers can help you create a physical activity plan  · Do not smoke  Nicotine and other chemicals in cigarettes and cigars can increase your risk for a heart attack and stroke  Ask your healthcare provider for information if you currently smoke and need help to quit  E-cigarettes or smokeless tobacco still contain nicotine  Talk to your healthcare provider before you use these products  · Eat heart-healthy foods  A dietitian or your provider can give you more information on low-sodium plans or the DASH (Dietary Approaches to Stop Hypertension) eating plan  The DASH plan is low in sodium, processed sugar, unhealthy fats, and total fat  It is high in potassium, calcium, and fiber  It is high in potassium, calcium, and fiber  These can be found in vegetables, fruit, and whole-grain foods  The following are ways to get more heart-healthy foods:         ? Decrease the total amount of fat you eat  Choose lean meats, fat-free or 1% fat milk, and low-fat dairy products, such as yogurt and cheese  Limit or do not eat red meat  Red meats are high in fat and cholesterol  ? Replace unhealthy fats with healthy fats  Unhealthy fats include saturated fat, trans fat, and cholesterol  Choose soft margarines that are low in saturated fat and have little or no trans fat  Monounsaturated fats are healthy fats  These are found in olive oil, canola oil, avocado, and nuts  Polyunsaturated fats are also healthy  These are found in fish, flaxseed, walnuts, and soybeans  ? Eat 5 or more servings of fruits and vegetables every day    They are low in calories and fat and a good source of essential vitamins  Include dark green, red, and orange vegetables  Examples include spinach, kale, broccoli, and carrots  ? Eat foods high in fiber  Fiber can help lower your cholesterol levels  Choose whole grain, high-fiber foods  Good choices include whole-wheat breads or cereals, beans, peas, fruits, and vegetables  ? Limit sodium (salt) as directed  Too much sodium can affect your fluid balance and blood pressure  Your healthcare provider will tell you how much sodium and potassium are safe for you to have in a day  He or she may recommend that you limit sodium to 2,300 mg a day  Your provider or a dietitian can help you find ways to limit sodium  For example, if you add salt while you cook, do not add more salt at the table  Check labels to find low-sodium or no-salt-added foods  Some low-sodium foods use potassium salts for flavor  Too much potassium can also cause health problems  · Ask your healthcare provider if it is okay for you to drink alcohol  Alcohol can increase your cholesterol and triglyceride levels  Your provider can tell you how many drinks are okay to have within 24 hours and within 1 week  A drink of alcohol is 12 ounces of beer, 5 ounces of wine, or 1½ ounces of liquor  Follow up with your doctor as directed: You may need to return for more tests  Your healthcare provider may refer you to a dietitian  Write down your questions so you remember to ask them during your visits  © Copyright [employer] 2022 Information is for End User's use only and may not be sold, redistributed or otherwise used for commercial purposes  All illustrations and images included in CareNotes® are the copyrighted property of [employer]  or [facility]   The above information is an  only  It is not intended as medical advice for individual conditions or treatments   Talk to your doctor, nurse or pharmacist before following any medical regimen to see if it is safe and effective for you

## 2022-05-02 DIAGNOSIS — I10 HYPERTENSION, UNSPECIFIED TYPE: ICD-10-CM

## 2022-05-02 RX ORDER — AMLODIPINE BESYLATE AND BENAZEPRIL HYDROCHLORIDE 5; 20 MG/1; MG/1
CAPSULE ORAL
Qty: 90 CAPSULE | Refills: 3 | Status: SHIPPED | OUTPATIENT
Start: 2022-05-02

## 2022-05-02 RX ORDER — METOPROLOL SUCCINATE 25 MG/1
TABLET, EXTENDED RELEASE ORAL
Qty: 90 TABLET | Refills: 3 | Status: SHIPPED | OUTPATIENT
Start: 2022-05-02

## 2022-07-08 DIAGNOSIS — I10 ESSENTIAL HYPERTENSION: ICD-10-CM

## 2022-07-08 RX ORDER — HYDROCHLOROTHIAZIDE 12.5 MG/1
TABLET ORAL
Qty: 90 TABLET | Refills: 0 | Status: SHIPPED | OUTPATIENT
Start: 2022-07-08 | End: 2022-10-06

## 2022-08-30 ENCOUNTER — APPOINTMENT (OUTPATIENT)
Dept: RADIOLOGY | Facility: MEDICAL CENTER | Age: 72
End: 2022-08-30
Payer: MEDICARE

## 2022-08-30 DIAGNOSIS — M25.561 RIGHT KNEE PAIN, UNSPECIFIED CHRONICITY: ICD-10-CM

## 2022-08-30 PROCEDURE — 73562 X-RAY EXAM OF KNEE 3: CPT

## 2022-10-06 DIAGNOSIS — I10 ESSENTIAL HYPERTENSION: ICD-10-CM

## 2022-10-06 RX ORDER — HYDROCHLOROTHIAZIDE 12.5 MG/1
TABLET ORAL
Qty: 90 TABLET | Refills: 0 | Status: SHIPPED | OUTPATIENT
Start: 2022-10-06

## 2023-01-16 ENCOUNTER — TELEPHONE (OUTPATIENT)
Dept: CARDIOLOGY CLINIC | Facility: MEDICAL CENTER | Age: 73
End: 2023-01-16

## 2023-01-16 DIAGNOSIS — I10 ESSENTIAL HYPERTENSION: ICD-10-CM

## 2023-01-16 RX ORDER — HYDROCHLOROTHIAZIDE 12.5 MG/1
12.5 TABLET ORAL DAILY
Qty: 90 TABLET | Refills: 1 | Status: SHIPPED | OUTPATIENT
Start: 2023-01-16

## 2023-02-07 DIAGNOSIS — I10 HYPERTENSION, UNSPECIFIED TYPE: ICD-10-CM

## 2023-02-07 RX ORDER — AMLODIPINE BESYLATE AND BENAZEPRIL HYDROCHLORIDE 5; 20 MG/1; MG/1
1 CAPSULE ORAL DAILY
Qty: 90 CAPSULE | Refills: 3 | Status: SHIPPED | OUTPATIENT
Start: 2023-02-07

## 2023-02-07 NOTE — TELEPHONE ENCOUNTER
Requested medication(s) are due for refill today: Yes  Patient has already received a courtesy refill: No  Other reason request has been forwarded to provider: misael

## 2023-03-08 DIAGNOSIS — I10 HYPERTENSION, UNSPECIFIED TYPE: ICD-10-CM

## 2023-03-15 RX ORDER — METOPROLOL SUCCINATE 25 MG/1
25 TABLET, EXTENDED RELEASE ORAL DAILY
Qty: 90 TABLET | Refills: 3 | Status: SHIPPED | OUTPATIENT
Start: 2023-03-15

## 2023-04-26 ENCOUNTER — HOSPITAL ENCOUNTER (OUTPATIENT)
Dept: CT IMAGING | Facility: HOSPITAL | Age: 73
Discharge: HOME | End: 2023-04-26

## 2023-04-26 DIAGNOSIS — I25.10 PLAQUE IN HEART ARTERY: ICD-10-CM

## 2023-04-26 DIAGNOSIS — E78.00 HIGH CHOLESTEROL: ICD-10-CM

## 2023-05-04 ENCOUNTER — OFFICE VISIT (OUTPATIENT)
Dept: CARDIOLOGY CLINIC | Facility: MEDICAL CENTER | Age: 73
End: 2023-05-04

## 2023-05-04 VITALS
WEIGHT: 226.3 LBS | HEART RATE: 65 BPM | BODY MASS INDEX: 25.49 KG/M2 | OXYGEN SATURATION: 95 % | SYSTOLIC BLOOD PRESSURE: 118 MMHG | DIASTOLIC BLOOD PRESSURE: 70 MMHG

## 2023-05-04 DIAGNOSIS — I71.21 ANEURYSM OF ASCENDING AORTA WITHOUT RUPTURE (HCC): ICD-10-CM

## 2023-05-04 DIAGNOSIS — I10 BENIGN ESSENTIAL HYPERTENSION: ICD-10-CM

## 2023-05-04 DIAGNOSIS — I51.7 LEFT VENTRICULAR HYPERTROPHY: ICD-10-CM

## 2023-05-04 DIAGNOSIS — E78.2 MIXED HYPERLIPIDEMIA: ICD-10-CM

## 2023-05-04 DIAGNOSIS — I25.84 CORONARY ARTERY DISEASE DUE TO CALCIFIED CORONARY LESION: Primary | ICD-10-CM

## 2023-05-04 DIAGNOSIS — R01.1 CARDIAC MURMUR, PREVIOUSLY UNDIAGNOSED: ICD-10-CM

## 2023-05-04 DIAGNOSIS — I25.10 CORONARY ARTERY DISEASE DUE TO CALCIFIED CORONARY LESION: Primary | ICD-10-CM

## 2023-05-04 RX ORDER — ROSUVASTATIN CALCIUM 5 MG/1
5 TABLET, COATED ORAL 3 TIMES WEEKLY
COMMUNITY
End: 2023-05-04 | Stop reason: SDUPTHER

## 2023-05-04 RX ORDER — ROSUVASTATIN CALCIUM 5 MG/1
5 TABLET, COATED ORAL 3 TIMES WEEKLY
Qty: 60 TABLET | Refills: 3 | Status: SHIPPED | OUTPATIENT
Start: 2023-05-05

## 2023-05-04 NOTE — PATIENT INSTRUCTIONS
You have mild degree of coronary artery calcium noted in your left main coronary artery  This implies there is very mild hardening of the heart arteries noted  Future cardiac risk can be lowered with lifestyle and risk factor modification (BP, glucose, cholesterol and weight management)  Regular exercise, Mediterranean style plant-based diet and cholesterol lowering with statin drugs has shown proven benefits in lowering risk of heart attack and stroke  Rosuvastatin 5mg every other night  Echocardiogram planned to evaluate heart function and rule out significant valvular heart disease

## 2023-05-04 NOTE — PROGRESS NOTES
[facility] [address] [address]  Phone#  [phone]  Fax#  [phone] [address] Cardiology Office Consultation             NAME: Carri Harmon  AGE: 72 y o  SEX: male   : 1950   MRN: [id number]    DATE: 2023  TIME: 11:21 AM    Cardiology Problem list:  Coronary artery disease: Calcium score 93, calcium localized to left main  Ascending aortic aneurysm  : Ectasia of the ascending thoracic aorta measuring 43 mm, no change since 2017  Hypertension with LVH  Dyslipidemia    Assessment/plan:    Coronary artery disease  Recent CT chest reviewed  Calcium score was 93  Images personally reviewed and shared with the patient  Calcium localized to the left main only  Calcification is mild in degree  No significant coronary calcification noted in the LAD, circumflex or the RCA  Normal coronary artery origins noted  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  Exercise treadmill stress echo planned for further risk stratification  Continue cardiac risk factor modification  Ascending aortic aneurysm  CT of the chest showed stable ascending aortic ectasia at 43 mm  Overall no change in aortic size since 2017  Hypertension  Currently on hydrochlorothiazide, metoprolol and amlodipine/benazepril  Cardiac murmur  Grade 3 murmur  Echocardiogram planned to evaluate heart function and rule out significant valvular heart disease  Dyslipidemia  Mixed dyslipidemia with low HDL  Long-term statin therapy indicated due to coronary artery calcification  Currently not taking the 10 mg pravastatin due to side effects  Trying nonpharmacologic measures for cholesterol reduction    Lab Results   Component Value Date    LDLCALC 102 (H) 2021   The 10-year ASCVD risk score (Saud ELIAS, et al , 2019) is: 21 1%    Values used to calculate the score:      Age: 72 years      Sex: Male      Is Non- : No      Diabetic: No      Tobacco smoker: No      Systolic Blood Pressure: 586 mmHg      Is BP treated: Yes      HDL Cholesterol: 31 mg/dL      Total Cholesterol: 155 mg/dL  Wants to try red yeast rice  Chief Complaint   Patient presents with     Abnormal calcium score            HPI:    Chuck Monroe is a 72y o -year-old male who presents to the cardiology clinic for initial evaluation  He has previously been seen by Dr Nishi Kwan  Last office visit from 4/22 reviewed  He has a history of hypertension, aortic aneurysm, LVH and hyperlipidemia  No interim cardiac hospitalizations  CT of the chest in 6/18 showed 44 mm ascending aortic aneurysm  Follow-up chest CT in 4/23 showed stable ascending thoracic aneurysm of 43 mm  He also had an abnormal calcium score performed recently  Calcium score was 93  All of the calcium was localized to the left main  Images personally reviewed and findings shared with the patient  He has had a historically abnormal EKG with left anterior fascicular block  He has stage II hypertension requiring 4 medications  He is currently not on statins  Previously was tried on low-dose pravastatin and developed side effects  Last LDL 2 years ago was close to 100  Past history, family history, social history, current medications, vital signs, recent lab and imaging studies and  prior cardiology studies reviewed independently on this visit    Wt Readings from Last 3 Encounters:   04/20/22 102 kg (225 lb)   12/16/21 104 kg (230 lb)   05/10/21 105 kg (230 lb 6 4 oz)     Pulse Readings from Last 3 Encounters:   04/20/22 96   12/16/21 73   05/10/21 80     BP Readings from Last 3 Encounters:   04/20/22 110/72   12/16/21 111/71   05/10/21 124/84       No Known Allergies    Current Outpatient Medications:     amLODIPine-benazepril (LOTREL 5-20) 5-20 MG per capsule, Take 1 capsule by mouth daily, Disp: 90 capsule, Rfl: 3    aspirin (ECOTRIN LOW STRENGTH) 81 mg EC tablet, TAKE 1 TABLET BY MOUTH EVERY DAY, Disp: 90 tablet, Rfl: 2    Coenzyme Q10 (CO Q 10) 10 MG CAPS, Take 1 capsule by mouth 2 (two) times a day, Disp: , Rfl:     hydrochlorothiazide (HYDRODIURIL) 12 5 mg tablet, Take 1 tablet (12 5 mg total) by mouth daily, Disp: 90 tablet, Rfl: 1    metoprolol succinate (TOPROL-XL) 25 mg 24 hr tablet, Take 1 tablet (25 mg total) by mouth daily, Disp: 90 tablet, Rfl: 3    multivitamin (THERAGRAN) TABS, Take 1 tablet by mouth daily, Disp: , Rfl:     OMEGA-3 FATTY ACIDS PO, Take 1 capsule by mouth daily , Disp: , Rfl:     Red Yeast Rice Extract (RED YEAST RICE PO), Take by mouth, Disp: , Rfl:     Theanine 200 MG CAPS, Take 200 mg by mouth, Disp: , Rfl:     pravastatin (PRAVACHOL) 10 mg tablet, Take 1 tablet (10 mg total) by mouth daily (Patient not taking: Reported on 4/20/2022 ), Disp: 90 tablet, Rfl: 3    Past Medical History:   Diagnosis Date    AAA (abdominal aortic aneurysm) (HCC)     Arthritis     wrist and knees    Hypertension     Mixed hyperlipidemia     Nontoxic single thyroid nodule      Past Surgical History:   Procedure Laterality Date    COLONOSCOPY      KNEE ARTHROSCOPY      therapeutic    VASECTOMY       Family History   Problem Relation Age of Onset    Heart attack Mother         MI    Sudden death Mother         SCD    Stroke Father         CVA    Heart attack Family         acute MI    Aneurysm Family         AAA    Cancer Family         gastric    Colon cancer Family     Liver cancer Family     Lung cancer Family     Prostate cancer Family     Stroke Family         syndrome     Social History   reports that he quit smoking about 28 years ago  His smoking use included cigarettes  He has a 12 50 pack-year smoking history  He has quit using smokeless tobacco  He reports that he does not currently use alcohol  He reports that he does not use drugs  Review of Systems   Constitutional: Negative for fever     Respiratory: Negative for chest tightness, shortness of breath and wheezing  Cardiovascular: Negative for chest pain, palpitations and leg swelling  Skin: Negative for rash  Neurological: Negative for syncope  Hematological: Does not bruise/bleed easily  Objective: There were no vitals filed for this visit  Physical Exam  Vitals reviewed  Constitutional:       General: He is not in acute distress  HENT:      Head: Normocephalic  Cardiovascular:      Rate and Rhythm: Normal rate and regular rhythm  Heart sounds: S1 normal and S2 normal  Murmur heard  Crescendo systolic murmur is present with a grade of 3/6  Pulmonary:      Breath sounds: No wheezing or rhonchi  Musculoskeletal:      Right lower leg: No edema  Left lower leg: No edema  Skin:     General: Skin is warm  Neurological:      Mental Status: He is alert  Mental status is at baseline     Psychiatric:         Mood and Affect: Mood normal          Pertinent Laboratory/Diagnostic Studies:    Laboratory studies reviewed personally by Bao Niño MD    BMP:   Lab Results   Component Value Date    SODIUM 142 03/25/2021    K 3 9 03/25/2021     03/25/2021    CO2 29 03/25/2021    BUN 22 03/25/2021    CREATININE 1 06 03/25/2021    GLUC 95 03/25/2021    CALCIUM 9 6 03/25/2021     CBC:  Lab Results   Component Value Date    WBC 8 6 06/03/2017    RBC 4 86 06/03/2017    HGB 14 4 06/03/2017    HCT 43 2 06/03/2017    MCV 88 8 06/03/2017    MCH 29 7 06/03/2017    RDW 13 8 06/03/2017     06/03/2017     Coags:    Lipid Profile:   Lab Results   Component Value Date    CHOL 159 12/08/2017     Lab Results   Component Value Date    HDL 31 (L) 05/07/2021     Lab Results   Component Value Date    LDLCALC 102 (H) 05/07/2021     Lab Results   Component Value Date    TRIG 119 05/07/2021      Other labs:  Lab Results   Component Value Date    LKY6CHXYXLDX 1 38 06/03/2017     Lab Results   Component Value Date    ALT 16 06/01/2018    AST 20 06/01/2018     No results found "for: BNP   No results for input(s): NTBNP in the last 72 hours  Imaging Studies:     Pertinent cardiac studies and imaging studies were personally reviewed on this visit and results summarized  Visit diagnoses:  1  Coronary artery disease due to calcified coronary lesion        2  Mixed hyperlipidemia        3  Left ventricular hypertrophy        4  Benign essential hypertension        5  Aneurysm of ascending aorta without rupture [facility])            Portions of the record may have been created with voice recognition software  Occasional wrong word or \"sound alike\" substitutions may have occurred due to the inherent limitations of voice recognition software  Read the chart carefully and recognize, using context, where substitutions have occurred  Please reach out to me directly for any clarifications    "

## 2023-05-24 ENCOUNTER — HOSPITAL ENCOUNTER (OUTPATIENT)
Dept: NON INVASIVE DIAGNOSTICS | Facility: MEDICAL CENTER | Age: 73
Discharge: HOME | End: 2023-05-24

## 2023-05-24 VITALS
SYSTOLIC BLOOD PRESSURE: 118 MMHG | WEIGHT: 226 LBS | DIASTOLIC BLOOD PRESSURE: 70 MMHG | BODY MASS INDEX: 26.15 KG/M2 | HEART RATE: 70 BPM | HEIGHT: 78 IN

## 2023-05-24 DIAGNOSIS — R01.1 CARDIAC MURMUR, PREVIOUSLY UNDIAGNOSED: ICD-10-CM

## 2023-05-24 LAB
AORTIC ROOT: 3.7 CM
APICAL FOUR CHAMBER EJECTION FRACTION: 65 %
ASCENDING AORTA: 4.1 CM
E WAVE DECELERATION TIME: 274 MS
FRACTIONAL SHORTENING: 33 % (ref 28–44)
INTERVENTRICULAR SEPTUM IN DIASTOLE (PARASTERNAL SHORT AXIS VIEW): 1.2 CM
INTERVENTRICULAR SEPTUM: 1.2 CM (ref 0.6–1.1)
LAAS-AP2: 21.2 CM2
LAAS-AP4: 21.1 CM2
LEFT ATRIUM SIZE: 3.8 CM
LEFT INTERNAL DIMENSION IN SYSTOLE: 3.9 CM (ref 2.1–4)
LEFT VENTRICLE DIASTOLIC VOLUME (MOD BIPLANE): 139 ML
LEFT VENTRICLE SYSTOLIC VOLUME (MOD BIPLANE): 48 ML
LEFT VENTRICULAR INTERNAL DIMENSION IN DIASTOLE: 5.8 CM (ref 3.5–6)
LEFT VENTRICULAR POSTERIOR WALL IN END DIASTOLE: 1 CM
LEFT VENTRICULAR STROKE VOLUME: 101 ML
LV EF: 65 %
LVSV (TEICH): 101 ML
MV E'TISSUE VEL-SEP: 15 CM/S
MV PEAK A VEL: 0.77 M/S
MV PEAK E VEL: 97 CM/S
MV STENOSIS PRESSURE HALF TIME: 79 MS
MV VALVE AREA P 1/2 METHOD: 2.78 CM2
RIGHT ATRIUM AREA SYSTOLE A4C: 21.7 CM2
RIGHT VENTRICLE ID DIMENSION: 4.1 CM
SL CV LEFT ATRIUM LENGTH A2C: 5.5 CM
SL CV LV EF: 65
SL CV PED ECHO LEFT VENTRICLE DIASTOLIC VOLUME (MOD BIPLANE) 2D: 168 ML
SL CV PED ECHO LEFT VENTRICLE SYSTOLIC VOLUME (MOD BIPLANE) 2D: 67 ML
TRICUSPID ANNULAR PLANE SYSTOLIC EXCURSION: 2.3 CM

## 2023-05-25 NOTE — RESULT ENCOUNTER NOTE
ECHO reviewed  Pumping strength (ejection fraction) is normal   Ascending aorta size is stable  Valves: Mitral valve has moderate backflow noted related to prolapse of the mitral valve leaflets  This will need a annual follow-up echocardiogram to assess for progression  Results sent to patient via [address]

## 2023-07-17 DIAGNOSIS — I10 ESSENTIAL HYPERTENSION: ICD-10-CM

## 2023-07-17 RX ORDER — HYDROCHLOROTHIAZIDE 12.5 MG/1
TABLET ORAL
Qty: 90 TABLET | Refills: 3 | Status: SHIPPED | OUTPATIENT
Start: 2023-07-17

## 2023-07-22 ENCOUNTER — OFFICE VISIT (OUTPATIENT)
Dept: URGENT CARE | Facility: MEDICAL CENTER | Age: 73
End: 2023-07-22
Payer: COMMERCIAL

## 2023-07-22 VITALS
TEMPERATURE: 98.8 F | SYSTOLIC BLOOD PRESSURE: 170 MMHG | DIASTOLIC BLOOD PRESSURE: 80 MMHG | RESPIRATION RATE: 18 BRPM | HEIGHT: 78 IN | HEART RATE: 68 BPM | WEIGHT: 220 LBS | OXYGEN SATURATION: 97 % | BODY MASS INDEX: 25.45 KG/M2

## 2023-07-22 DIAGNOSIS — S81.811A LACERATION OF RIGHT LOWER LEG, INITIAL ENCOUNTER: Primary | ICD-10-CM

## 2023-07-22 PROCEDURE — 99213 OFFICE O/P EST LOW 20 MIN: CPT | Performed by: PHYSICIAN ASSISTANT

## 2023-07-22 NOTE — PATIENT INSTRUCTIONS
Laceration to right lower leg  Keep area clean and dry, change dressing twice daily  Return in 7 to 10 days for suture removal  Follow up with PCP in 3-5 days. Proceed to  ER if symptoms worsen.

## 2023-07-22 NOTE — PROGRESS NOTES
[facility]        NAME: Ana Galeas is a 72 y.o. male  : 1950    MRN: [id number]  DATE: 2023  TIME: 4:32 PM    Assessment and Plan   Laceration of right lower leg, initial encounter [S81.811A]  1. Laceration of right lower leg, initial encounter              Patient Instructions     Laceration to right lower leg  Keep area clean and dry, change dressing twice daily  Return in 7 to 10 days for suture removal  Follow up with PCP in 3-5 days. Proceed to  ER if symptoms worsen. Chief Complaint     Chief Complaint   Patient presents with   • Laceration     Right lower shin;          History of Present Illness       74-year-old male who presents complaining of having cut himself with the sharp edge of a plastic piece in the truck today. Denies any other trauma. Patient declined tetanus injection at this time and states that he will check with his primary care doctor. Declined antibiotics at this time. Ankle Swelling  Pertinent negatives include no chest pain or coughing. The symptoms are aggravated by weight bearing. Review of Systems   Review of Systems   Constitutional: Negative. HENT: Negative. Eyes: Negative. Respiratory: Negative. Negative for apnea, cough, choking, chest tightness, shortness of breath, wheezing and stridor. Cardiovascular: Negative. Negative for chest pain. Skin: Positive for wound.          Current Medications       Current Outpatient Medications:   •  amLODIPine-benazepril (LOTREL 5-20) 5-20 MG per capsule, Take 1 capsule by mouth daily, Disp: 90 capsule, Rfl: 3  •  aspirin (ECOTRIN LOW STRENGTH) 81 mg EC tablet, TAKE 1 TABLET BY MOUTH EVERY DAY, Disp: 90 tablet, Rfl: 2  •  Coenzyme Q10 (CO Q 10) 10 MG CAPS, Take 1 capsule by mouth 2 (two) times a day, Disp: , Rfl:   •  hydrochlorothiazide (HYDRODIURIL) 12.5 mg tablet, TAKE 1 TABLET DAILY, Disp: 90 tablet, Rfl: 3  •  metoprolol succinate (TOPROL-XL) 25 mg 24 hr tablet, Take 1 tablet (25 mg total) by mouth daily, Disp: 90 tablet, Rfl: 3  •  multivitamin (THERAGRAN) TABS, Take 1 tablet by mouth daily, Disp: , Rfl:   •  OMEGA-3 FATTY ACIDS PO, Take 1 capsule by mouth daily , Disp: , Rfl:   •  Red Yeast Rice Extract (RED YEAST RICE PO), Take by mouth, Disp: , Rfl:   •  rosuvastatin (CRESTOR) 5 mg tablet, Take 1 tablet (5 mg total) by mouth 3 (three) times a week, Disp: 60 tablet, Rfl: 3  •  Theanine 200 MG CAPS, Take 200 mg by mouth, Disp: , Rfl:     Current Allergies     Allergies as of 07/22/2023   • (No Known Allergies)            The following portions of the patient's history were reviewed and updated as appropriate: allergies, current medications, past family history, past medical history, past social history, past surgical history and problem list.     Past Medical History:   Diagnosis Date   • AAA (abdominal aortic aneurysm) (HCC)    • Arthritis     wrist and knees   • Hypertension    • Mixed hyperlipidemia    • Nontoxic single thyroid nodule        Past Surgical History:   Procedure Laterality Date   • COLONOSCOPY     • KNEE ARTHROSCOPY      therapeutic   • VASECTOMY         Family History   Problem Relation Age of Onset   • Heart attack Mother         MI   • Sudden death Mother         SCD   • Stroke Father         CVA   • Heart attack Family         acute MI   • Aneurysm Family         AAA   • Cancer Family         gastric   • Colon cancer Family    • Liver cancer Family    • Lung cancer Family    • Prostate cancer Family    • Stroke Family         syndrome         Medications have been verified. Objective   /80   Pulse 68   Temp 98.8 °F (37.1 °C) (Temporal)   Resp 18   Ht 6' 7" (2.007 m)   Wt 99.8 kg (220 lb)   SpO2 97%   BMI 24.78 kg/m²        Physical Exam     Physical Exam  Constitutional:       General: He is not in acute distress. Appearance: He is well-developed. He is not diaphoretic. Cardiovascular:      Rate and Rhythm: Normal rate and regular rhythm. Heart sounds: Normal heart sounds. Pulmonary:      Effort: Pulmonary effort is normal. No respiratory distress. Breath sounds: Normal breath sounds. No wheezing or rales. Chest:      Chest wall: No tenderness. Musculoskeletal:      Cervical back: Normal range of motion and neck supple. Legs:    Lymphadenopathy:      Cervical: No cervical adenopathy. Area cleaned and prepped in sterile fashion, anesthetized with 1% lidocaine, sutured with 4-0 Ethilon, 5 stitches applied. Area dressed with Neosporin and gauze.   Patient tolerated procedure with no complications      Answers for HPI/ROS submitted by the patient on 7/22/2023  Incident occurred: 1 to 3 hours ago  Incident location: in the street  Injury mechanism: a direct blow  Pain location: right leg  Pain quality: aching  Pain - numeric: 1/10  Pain course: improving  tingling: Yes  inability to bear weight: No  loss of motion: No  loss of sensation: No  muscle weakness: No  Foreign body present: no foreign bodies

## 2023-07-27 ENCOUNTER — TELEPHONE (OUTPATIENT)
Dept: OBGYN CLINIC | Facility: MEDICAL CENTER | Age: 73
End: 2023-07-27

## 2023-07-27 ENCOUNTER — OFFICE VISIT (OUTPATIENT)
Dept: RHEUMATOLOGY | Facility: CLINIC | Age: 73
End: 2023-07-27
Payer: COMMERCIAL

## 2023-07-27 VITALS — DIASTOLIC BLOOD PRESSURE: 84 MMHG | BODY MASS INDEX: 24.63 KG/M2 | SYSTOLIC BLOOD PRESSURE: 142 MMHG | WEIGHT: 218.6 LBS

## 2023-07-27 DIAGNOSIS — Z13.29 SCREENING FOR THYROID DISORDER: ICD-10-CM

## 2023-07-27 DIAGNOSIS — M19.039 WRIST ARTHRITIS: ICD-10-CM

## 2023-07-27 DIAGNOSIS — M17.11 PRIMARY OSTEOARTHRITIS OF RIGHT KNEE: ICD-10-CM

## 2023-07-27 DIAGNOSIS — M35.3 PMR (POLYMYALGIA RHEUMATICA) (HCC): Primary | ICD-10-CM

## 2023-07-27 DIAGNOSIS — E55.9 VITAMIN D DEFICIENCY: ICD-10-CM

## 2023-07-27 DIAGNOSIS — Z79.52 CURRENT CHRONIC USE OF SYSTEMIC STEROIDS: ICD-10-CM

## 2023-07-27 DIAGNOSIS — Z11.59 NEED FOR HEPATITIS C SCREENING TEST: ICD-10-CM

## 2023-07-27 PROCEDURE — 99204 OFFICE O/P NEW MOD 45 MIN: CPT | Performed by: INTERNAL MEDICINE

## 2023-07-27 RX ORDER — PREDNISONE 5 MG/1
TABLET ORAL
COMMUNITY
Start: 2023-07-25

## 2023-07-27 RX ORDER — PREDNISONE 10 MG/1
TABLET ORAL
COMMUNITY
Start: 2023-07-04

## 2023-07-27 RX ORDER — PREDNISONE 2.5 MG/1
TABLET ORAL
Qty: 60 TABLET | Refills: 0 | Status: SHIPPED | OUTPATIENT
Start: 2023-07-27

## 2023-07-27 NOTE — TELEPHONE ENCOUNTER
Caller: Patient    Doctor: Newton Lindsey    Reason for call:     Patient was seen by Dr Newton Lindsey today, he has a question about a script for his wrists (rigth is worse)  Blood work states for a script for Vitamin D, he believes his insurance will not pay for it. He had this   Done within the last year and he does have the report if he could bring this in or fax to her office. He is asking about pain relief, what can he take while he is taking Prednisone? ?    Call back#:  [phone]

## 2023-07-27 NOTE — PROGRESS NOTES
Assessment and Plan:   Mr. Taylor Ewing is a 72-year-old male with no significant past medical history who presents for an evaluation of polymyalgia rheumatica/joint pains and stiffness. He is currently on prednisone 15 mg once daily prescribed by his primary care physician. He is self-referred today. # Polymyalgia rheumatica  - Dylan Manriquez presents today for an evaluation of abrupt onset primarily proximal upper and lower extremity pain and stiffness he has been experiencing since early May 2023. Based on the abrupt onset of symptoms, his age group, significantly elevated inflammatory markers as well as significant response noted to low-dose steroids his presentation is consistent with polymyalgia rheumatica. He is currently on prednisone 15 mg once daily but is not achieving complete response of his symptoms. He would like to avoid a higher dose of 20 mg once daily as this caused restlessness. In view of this I will increase the dose of prednisone to 17.5 mg once daily for the next month and then follow-up with him for a virtual appointment. If he is doing well I will guide a further steroid taper. If there is resistance noted to treatment and he is hesitant to increase the prednisone dose further then we will discuss the addition of steroid sparing agents. I requested he update the inflammatory markers a few days prior to our follow-up appointment. He is not reporting symptoms that would be concerning for temporal arteritis. - On examination he is also found to have significant chronic synovial changes affecting his bilateral wrists. To assess if this may be secondary to a degenerative process versus chronic inflammation I would like him to obtain bilateral wrist x-rays to assess for inflammatory findings. Plan:  Diagnoses and all orders for this visit:    PMR (polymyalgia rheumatica) ([address])  -     Sjogren's Antibodies; Future  -     Sedimentation rate, automated;  Future  -     C-reactive protein; Future  -     CBC and differential; Future  -     CK; Future  -     Comprehensive metabolic panel; Future  -     Protein electrophoresis, serum; Future  -     Uric acid; Future  -     predniSONE 2.5 mg tablet; Combine to take 17.5 mg once daily. Wrist arthritis  -     XR wrist 3+ vw left; Future  -     XR wrist 3+ vw right; Future    Current chronic use of systemic steroids    Primary osteoarthritis of right knee    Need for hepatitis C screening test  -     Chronic Hepatitis Panel; Future    Screening for thyroid disorder  -     TSH, 3rd generation; Future    Vitamin D deficiency  -     Vitamin D 25 hydroxy; Future    Other orders  -     predniSONE 10 mg tablet  -     predniSONE 5 mg tablet      I have personally reviewed pertinent films in PACS of the right knee XR which shows osteoarthritis. Activities as tolerated. Exercise: try to maintain a low impact exercise regimen as much as possible. Continue other medications as prescribed by PCP and other specialists. RTC in 4 weeks for virtual appointment. HPI  Mr. Stephie Rodriguez is a 44-year-old male with no significant past medical history who presents for an evaluation of polymyalgia rheumatica/joint pains and stiffness. He is currently on prednisone 15 mg once daily prescribed by his primary care physician. He is self-referred today. Patient reports he was in his usual state of health up until the end of April/early May 2023 at which time he was working in [employer] and thought he may have had a tick bite. He did not experience any immediate symptoms or notice a skin rash but reports shortly afterwards he started to experience an aching sensation in his right leg associated with swelling mostly around the knee. He was evaluated by his primary care physician and at separate intervals was tested for Lyme disease which were both negative. Initial labs showed a sed rate of 22 with a C-reactive protein of 21.3.   There was a consideration for polymyalgia rheumatica so he was started on prednisone on 5/25/23 at 20 mg once daily for 2 weeks and then 10 mg once daily for 2 weeks. He reports within 2 days of taking the prednisone 20 mg once daily he had complete resolution of his symptoms and did well throughout the remaining of the 4-week course. He mentions after completing the steroid course he started to experience a recurrence of symptoms with pain and stiffness affecting his neck, shoulders, bicep region, hands and back of his thighs. He did not notice any swollen joints. Morning stiffness was taking at least 2 hours to improve. He was again seen by his primary care physician and had labs done which showed an elevated ESR of 60 and C-reactive protein of 53.3. He was restarted on prednisone at 10 mg once daily for 2 weeks starting on July 4 but he mentions this dose did not help him as much. The prednisone was then increased to 15 mg once daily which he is still on and reports while it has helped about 70% he is still experiencing some joint pains and stiffness. He had labs checked on July 24 which showed a downtrending ESR of 28 and C-reactive protein of 9. He mentions in the past 3 months he has noticed some weight loss of approximately 8 pounds. He feels his appetite has also decreased. No fevers, headaches, scalp tenderness, vision changes or jaw claudication. No history of inflammatory eye disease, psoriasis, inflammatory bowel disease or family history of rheumatic disease. His brother passed away as a result of primary sclerosing cholangitis. The following portions of the patient's history were reviewed and updated as appropriate: allergies, current medications, past family history, past medical history, past social history, past surgical history and problem list.      Review of Systems  Constitutional: Negative for fevers, chills, night sweats, fatigue. Positive for weight loss.   ENT/Mouth: Negative for hearing changes, ear pain, nasal congestion, sinus pain, hoarseness, sore throat, rhinorrhea, swallowing difficulty. Eyes: Negative for pain, redness, discharge, vision changes. Cardiovascular: Negative for chest pain, SOB, palpitations. Respiratory: Negative for cough, sputum, wheezing, dyspnea. Gastrointestinal: Negative for nausea, vomiting, diarrhea, constipation, pain, heartburn. Genitourinary: Negative for dysuria, urinary frequency, hematuria. Musculoskeletal: As per HPI. Skin: Negative for skin rash, color changes. Neuro: Negative for weakness, numbness, tingling, loss of consciousness. Psych: Negative for anxiety, depression. Heme/Lymph: Negative for easy bruising, bleeding, lymphadenopathy.         Past Medical History:   Diagnosis Date   • AAA (abdominal aortic aneurysm) (HCC)    • Arthritis     wrist and knees   • Hypertension    • Mixed hyperlipidemia    • Nontoxic single thyroid nodule        Past Surgical History:   Procedure Laterality Date   • COLONOSCOPY     • KNEE ARTHROSCOPY      therapeutic   • VASECTOMY         Social History     Socioeconomic History   • Marital status: /Civil Union     Spouse name: Not on file   • Number of children: Not on file   • Years of education: Not on file   • Highest education level: Not on file   Occupational History   • Not on file   Tobacco Use   • Smoking status: Former     Packs/day: 0.50     Years: 25.00     Total pack years: 12.50     Types: Cigarettes     Quit date: 18     Years since quittin.5   • Smokeless tobacco: Former   Vaping Use   • Vaping Use: Never used   Substance and Sexual Activity   • Alcohol use: Not Currently     Comment: social   • Drug use: No   • Sexual activity: Not on file   Other Topics Concern   • Not on file   Social History Narrative   • Not on file     Social Determinants of Health     Financial Resource Strain: Not on file   Food Insecurity: Not on file   Transportation Needs: Not on file   Physical Activity: Not on file Stress: Not on file   Social Connections: Not on file   Intimate Partner Violence: Not on file   Housing Stability: Not on file       Family History   Problem Relation Age of Onset   • Heart attack Mother         MI   • Sudden death Mother         SCD   • Stroke Father         CVA   • Heart attack Family         acute MI   • Aneurysm Family         AAA   • Cancer Family         gastric   • Colon cancer Family    • Liver cancer Family    • Lung cancer Family    • Prostate cancer Family    • Stroke Family         syndrome       No Known Allergies      Current Outpatient Medications:   •  predniSONE 10 mg tablet, , Disp: , Rfl:   •  predniSONE 2.5 mg tablet, Combine to take 17.5 mg once daily. , Disp: 60 tablet, Rfl: 0  •  amLODIPine-benazepril (LOTREL 5-20) 5-20 MG per capsule, Take 1 capsule by mouth daily, Disp: 90 capsule, Rfl: 3  •  Coenzyme Q10 (CO Q 10) 10 MG CAPS, Take 1 capsule by mouth 2 (two) times a day, Disp: , Rfl:   •  hydrochlorothiazide (HYDRODIURIL) 12.5 mg tablet, TAKE 1 TABLET DAILY, Disp: 90 tablet, Rfl: 3  •  metoprolol succinate (TOPROL-XL) 25 mg 24 hr tablet, Take 1 tablet (25 mg total) by mouth daily, Disp: 90 tablet, Rfl: 3  •  multivitamin (THERAGRAN) TABS, Take 1 tablet by mouth daily, Disp: , Rfl:   •  OMEGA-3 FATTY ACIDS PO, Take 1 capsule by mouth daily , Disp: , Rfl:   •  predniSONE 5 mg tablet, , Disp: , Rfl:   •  rosuvastatin (CRESTOR) 5 mg tablet, Take 1 tablet (5 mg total) by mouth 3 (three) times a week (Patient not taking: Reported on 7/27/2023), Disp: 60 tablet, Rfl: 3      Objective:    Vitals:    07/27/23 1255   BP: 142/84   Weight: 99.2 kg (218 lb 9.6 oz)       Physical Exam  General: Well appearing, well nourished, in no distress. Oriented x 3, normal mood and affect. Ambulating without difficulty. Skin: Good turgor, no rash, unusual bruising or prominent lesions. Hair: Normal texture and distribution. Nails: Normal color, no deformities.   HEENT:  Head: Normocephalic, atraumatic. Eyes: Conjunctiva clear, sclera non-icteric, EOM intact. Extremities: No amputations or deformities, cyanosis, edema. Musculoskeletal:   Hands -mild soft tissue swelling of the right hand third PIP joint without tenderness. The remaining PIP and MCP joints are unremarkable. Wrists -significant chronic synovial changes noted bilaterally without tenderness. He has significant limitation with both flexion and extension bilaterally, worse on the right side. Elbows and shoulders -unremarkable. Knees, ankles and feet -unremarkable. Myofascial tenderness noted in the bilateral proximal upper extremities. Neurologic: Alert and oriented. No focal neurological deficits appreciated. Psychiatric: Normal mood and affect. Michelle Aragon M.D.   Rheumatology

## 2023-07-27 NOTE — TELEPHONE ENCOUNTER
What is the question about the wrists, I ordered bilateral wrist x-rays. If he had a recent vitamin D level he does not need to do it again. In addition to the prednisone he may take Tylenol as needed. The hope is that the increased dose of prednisone should help with the pain.

## 2023-07-28 ENCOUNTER — TELEPHONE (OUTPATIENT)
Dept: RHEUMATOLOGY | Facility: CLINIC | Age: 73
End: 2023-07-28

## 2023-07-28 NOTE — TELEPHONE ENCOUNTER
Patient was called and a VM was left asking him what his questions are about his wrist x-rays. He was also told not to complete the Vitamin D blood work if he had it completed with in the last year. Also to take tylenol as needed for his pain along with his increased prednisone.

## 2023-08-07 ENCOUNTER — APPOINTMENT (OUTPATIENT)
Dept: RADIOLOGY | Facility: MEDICAL CENTER | Age: 73
End: 2023-08-07
Payer: COMMERCIAL

## 2023-08-07 DIAGNOSIS — M19.039 WRIST ARTHRITIS: ICD-10-CM

## 2023-08-07 PROCEDURE — 73110 X-RAY EXAM OF WRIST: CPT

## 2023-08-15 LAB
ALBUMIN SERPL ELPH-MCNC: 3.9 G/DL (ref 3.8–4.8)
ALPHA1 GLOB SERPL ELPH-MCNC: 0.3 G/DL (ref 0.2–0.3)
ALPHA2 GLOB SERPL ELPH-MCNC: 0.8 G/DL (ref 0.5–0.9)
BASOPHILS # BLD AUTO: 42 CELLS/UL (ref 0–200)
BASOPHILS NFR BLD AUTO: 0.3 %
BETA1 GLOB SERPL ELPH-MCNC: 0.4 G/DL (ref 0.4–0.6)
BETA2 GLOB SERPL ELPH-MCNC: 0.4 G/DL (ref 0.2–0.5)
CK SERPL-CCNC: 55 U/L (ref 44–196)
CRP SERPL-MCNC: 3.8 MG/L
ENA SS-A AB SER IA-ACNC: NORMAL AI
ENA SS-B AB SER IA-ACNC: NORMAL AI
EOSINOPHIL # BLD AUTO: 181 CELLS/UL (ref 15–500)
EOSINOPHIL NFR BLD AUTO: 1.3 %
ERYTHROCYTE [DISTWIDTH] IN BLOOD BY AUTOMATED COUNT: 13.8 % (ref 11–15)
ERYTHROCYTE [SEDIMENTATION RATE] IN BLOOD BY WESTERGREN METHOD: 17 MM/H
GAMMA GLOB SERPL ELPH-MCNC: 1 G/DL (ref 0.8–1.7)
HCT VFR BLD AUTO: 41 % (ref 38.5–50)
HGB BLD-MCNC: 14 G/DL (ref 13.2–17.1)
LYMPHOCYTES # BLD AUTO: 1501 CELLS/UL (ref 850–3900)
LYMPHOCYTES NFR BLD AUTO: 10.8 %
MCH RBC QN AUTO: 30.4 PG (ref 27–33)
MCHC RBC AUTO-ENTMCNC: 34.1 G/DL (ref 32–36)
MCV RBC AUTO: 88.9 FL (ref 80–100)
MONOCYTES # BLD AUTO: 778 CELLS/UL (ref 200–950)
MONOCYTES NFR BLD AUTO: 5.6 %
NEUTROPHILS # BLD AUTO: ABNORMAL CELLS/UL (ref 1500–7800)
NEUTROPHILS NFR BLD AUTO: 82 %
PLATELET # BLD AUTO: 252 THOUSAND/UL (ref 140–400)
PMV BLD REES-ECKER: 9.2 FL (ref 7.5–12.5)
PROT SERPL-MCNC: 6.8 G/DL (ref 6.1–8.1)
RBC # BLD AUTO: 4.61 MILLION/UL (ref 4.2–5.8)
TSH SERPL-ACNC: 0.82 MIU/L (ref 0.4–4.5)
URATE SERPL-MCNC: 5.7 MG/DL (ref 4–8)
WBC # BLD AUTO: 13.9 THOUSAND/UL (ref 3.8–10.8)

## 2023-08-18 DIAGNOSIS — M35.3 PMR (POLYMYALGIA RHEUMATICA) (HCC): ICD-10-CM

## 2023-08-18 RX ORDER — PREDNISONE 2.5 MG/1
TABLET ORAL
Qty: 60 TABLET | Refills: 0 | Status: SHIPPED | OUTPATIENT
Start: 2023-08-18

## 2023-08-23 ENCOUNTER — HOSPITAL ENCOUNTER (OUTPATIENT)
Dept: RADIOLOGY | Facility: MEDICAL CENTER | Age: 73
Discharge: HOME | End: 2023-08-23
Payer: COMMERCIAL

## 2023-08-23 DIAGNOSIS — E04.1 THYROID NODULE: ICD-10-CM

## 2023-08-23 PROCEDURE — 76536 US EXAM OF HEAD AND NECK: CPT

## 2023-08-24 ENCOUNTER — TELEPHONE (OUTPATIENT)
Dept: RHEUMATOLOGY | Facility: CLINIC | Age: 73
End: 2023-08-24

## 2023-08-24 NOTE — TELEPHONE ENCOUNTER
----- Message from Anne-Marie Pantoja MD sent at 8/14/2023  7:38 PM EDT -----  Regarding: FW: Mila Like done 8/14  Contact: [phone]  Please get results before our appt.  ----- Message -----  From: Areli Park"  Sent: 8/14/2023   7:29 PM EDT  To: Anne-Marie Pantoja MD  Subject: Bloodwork done 8/14                              [email or web address]    Also, I had my wrist x-rays done at [facility] on Monday the 7th but still haven't seen the report.

## 2023-08-25 LAB
ALBUMIN SERPL-MCNC: 3.9 G/DL (ref 3.6–5.1)
ALBUMIN/GLOB SERPL: 1.4 (CALC) (ref 1–2.5)
ALP SERPL-CCNC: 43 U/L (ref 35–144)
ALT SERPL-CCNC: 15 U/L (ref 9–46)
AST SERPL-CCNC: 16 U/L (ref 10–35)
BILIRUB SERPL-MCNC: 0.6 MG/DL (ref 0.2–1.2)
BUN SERPL-MCNC: 19 MG/DL (ref 7–25)
BUN/CREAT SERPL: ABNORMAL (CALC) (ref 6–22)
CALCIUM SERPL-MCNC: 9.3 MG/DL (ref 8.6–10.3)
CHLORIDE SERPL-SCNC: 103 MMOL/L (ref 98–110)
CO2 SERPL-SCNC: 30 MMOL/L (ref 20–32)
CREAT SERPL-MCNC: 1.02 MG/DL (ref 0.7–1.28)
GFR/BSA.PRED SERPLBLD CYS-BASED-ARV: 79 ML/MIN/1.73M2
GLOBULIN SER CALC-MCNC: 2.8 G/DL (CALC) (ref 1.9–3.7)
GLUCOSE SERPL-MCNC: 73 MG/DL (ref 65–99)
POTASSIUM SERPL-SCNC: 3.3 MMOL/L (ref 3.5–5.3)
PROT SERPL-MCNC: 6.7 G/DL (ref 6.1–8.1)
SODIUM SERPL-SCNC: 142 MMOL/L (ref 135–146)

## 2023-08-28 ENCOUNTER — TELEMEDICINE (OUTPATIENT)
Dept: RHEUMATOLOGY | Facility: CLINIC | Age: 73
End: 2023-08-28
Payer: COMMERCIAL

## 2023-08-28 ENCOUNTER — TELEPHONE (OUTPATIENT)
Dept: RHEUMATOLOGY | Facility: CLINIC | Age: 73
End: 2023-08-28

## 2023-08-28 DIAGNOSIS — M17.11 PRIMARY OSTEOARTHRITIS OF RIGHT KNEE: ICD-10-CM

## 2023-08-28 DIAGNOSIS — M19.039 WRIST ARTHRITIS: ICD-10-CM

## 2023-08-28 DIAGNOSIS — Z79.52 CURRENT CHRONIC USE OF SYSTEMIC STEROIDS: ICD-10-CM

## 2023-08-28 DIAGNOSIS — M35.3 PMR (POLYMYALGIA RHEUMATICA) (HCC): Primary | ICD-10-CM

## 2023-08-28 PROCEDURE — 99214 OFFICE O/P EST MOD 30 MIN: CPT | Performed by: INTERNAL MEDICINE

## 2023-08-28 NOTE — PROGRESS NOTES
Virtual Regular Visit    Verification of patient location:    Patient is located at Home in the following state in which I hold an active license [place]      Assessment/Plan:    Problem List Items Addressed This Visit    None  Visit Diagnoses     PMR (polymyalgia rheumatica) (HCC)    -  Primary    Relevant Orders    C-reactive protein    Sedimentation rate, automated    Protein electrophoresis, serum    Current chronic use of systemic steroids        Primary osteoarthritis of right knee        Wrist arthritis                   Reason for visit is follow up. Chief Complaint   Patient presents with   • Virtual Regular Visit        Encounter provider Azeb Feliciano MD    Provider located at [address]  [address], [address] [facility]  [phone]      Recent Visits  Date Type Provider Dept   08/24/23 Telephone Tierney Rivera, [address] [facility] Nazia recent visits within past 7 days and meeting all other requirements  Today's Visits  Date Type Provider Dept   08/28/23 Telephone Azeb Feliciano MD [facility] Rog[facility]io H[facility]   08/28/23 Telemedicine Azeb Feliciano MD [facility] Fillers today's visits and meeting all other requirements  Future Appointments  No visits were found meeting these conditions. Showing future appointments within next 150 days and meeting all other requirements       The patient was identified by name and date of birth. Carlos Morillo was informed that this is a telemedicine visit and that the visit is being conducted through the [employer]. He agrees to proceed. .  My office door was closed. No one else was in the room. He acknowledged consent and understanding of privacy and security of the video platform. The patient has agreed to participate and understands they can discontinue the visit at any time.     Patient is aware this is a billable service. Subjective    HPI     INITIAL VISIT NOTE (7/2023):  Mr. Sameera Yost is a 72-year-old male with no significant past medical history who presents for an evaluation of polymyalgia rheumatica/joint pains and stiffness. He is currently on prednisone 15 mg once daily prescribed by his primary care physician. He is self-referred today.     Patient reports he was in his usual state of health up until the end of April/early May 2023 at which time he was working in [employer] and thought he may have had a tick bite. He did not experience any immediate symptoms or notice a skin rash but reports shortly afterwards he started to experience an aching sensation in his right leg associated with swelling mostly around the knee. He was evaluated by his primary care physician and at separate intervals was tested for Lyme disease which were both negative. Initial labs showed a sed rate of 22 with a C-reactive protein of 21.3. There was a consideration for polymyalgia rheumatica so he was started on prednisone on 5/25/23 at 20 mg once daily for 2 weeks and then 10 mg once daily for 2 weeks. He reports within 2 days of taking the prednisone 20 mg once daily he had complete resolution of his symptoms and did well throughout the remaining of the 4-week course. He mentions after completing the steroid course he started to experience a recurrence of symptoms with pain and stiffness affecting his neck, shoulders, bicep region, hands and back of his thighs. He did not notice any swollen joints. Morning stiffness was taking at least 2 hours to improve. He was again seen by his primary care physician and had labs done which showed an elevated ESR of 60 and C-reactive protein of 53.3. He was restarted on prednisone at 10 mg once daily for 2 weeks starting on July 4 but he mentions this dose did not help him as much.   The prednisone was then increased to 15 mg once daily which he is still on and reports while it has helped about 70% he is still experiencing some joint pains and stiffness. He had labs checked on July 24 which showed a downtrending ESR of 28 and C-reactive protein of 9. He mentions in the past 3 months he has noticed some weight loss of approximately 8 pounds. He feels his appetite has also decreased. No fevers, headaches, scalp tenderness, vision changes or jaw claudication. No history of inflammatory eye disease, psoriasis, inflammatory bowel disease or family history of rheumatic disease. His brother passed away as a result of primary sclerosing cholangitis. 8/28/2023:  Patient presents for a follow-up of polymyalgia rheumatica. He is currently on prednisone 15 mg once daily. I reviewed his recent labs which showed normal inflammatory markers. An SPEP showed a faint band visible with overall polyclonal pattern in the gamma region possibly representing an inflammatory or acute phase response, but a developing plasma cell disorder cannot be excluded. A uric acid level, CK, Sjogren's antibodies and TSH were unremarkable. X-rays of the bilateral wrists showed advanced degenerative changes. At the last visit we discussed restarting prednisone at 17.5 mg once daily which he was on for about a month and then decreased the prednisone to 15 mg once daily yesterday. He mentions he has been doing well on this except for noticing some slight stiffness affecting his fingers. Other than this no concerning joint pains, swelling or stiffness. He mentions he does have a wound on his leg and states that healing has been slow because of the steroid use. It does seem to be improving slowly.       Past Medical History:   Diagnosis Date   • AAA (abdominal aortic aneurysm) (HCC)    • Arthritis     wrist and knees   • Hypertension    • Mixed hyperlipidemia    • Nontoxic single thyroid nodule        Past Surgical History:   Procedure Laterality Date   • COLONOSCOPY     • KNEE ARTHROSCOPY      therapeutic   • VASECTOMY Current Outpatient Medications   Medication Sig Dispense Refill   • amLODIPine-benazepril (LOTREL 5-20) 5-20 MG per capsule Take 1 capsule by mouth daily 90 capsule 3   • Coenzyme Q10 (CO Q 10) 10 MG CAPS Take 1 capsule by mouth 2 (two) times a day     • hydrochlorothiazide (HYDRODIURIL) 12.5 mg tablet TAKE 1 TABLET DAILY 90 tablet 3   • metoprolol succinate (TOPROL-XL) 25 mg 24 hr tablet Take 1 tablet (25 mg total) by mouth daily 90 tablet 3   • multivitamin (THERAGRAN) TABS Take 1 tablet by mouth daily     • OMEGA-3 FATTY ACIDS PO Take 1 capsule by mouth daily      • predniSONE 10 mg tablet      • predniSONE 2.5 mg tablet COMBINE TO TAKE 17.5 MG ONCE DAILY. 60 tablet 0   • predniSONE 5 mg tablet      • rosuvastatin (CRESTOR) 5 mg tablet Take 1 tablet (5 mg total) by mouth 3 (three) times a week (Patient not taking: Reported on 7/27/2023) 60 tablet 3     No current facility-administered medications for this visit. No Known Allergies      Review of Systems  Constitutional: Negative for weight change, fevers, chills, night sweats, fatigue. ENT/Mouth: Negative for hearing changes, ear pain, nasal congestion, sinus pain, hoarseness, sore throat, rhinorrhea, swallowing difficulty. Eyes: Negative for pain, redness, discharge, vision changes. Cardiovascular: Negative for chest pain, SOB, palpitations. Respiratory: Negative for cough, sputum, wheezing, dyspnea. Gastrointestinal: Negative for nausea, vomiting, diarrhea, constipation, pain, heartburn. Genitourinary: Negative for dysuria, urinary frequency, hematuria. Musculoskeletal: As per HPI. Skin: Negative for skin rash, color changes. Neuro: Negative for weakness, numbness, tingling, loss of consciousness. Psych: Negative for anxiety, depression. Heme/Lymph: Negative for easy bruising, bleeding, lymphadenopathy. Video Exam    There were no vitals filed for this visit.     Physical Exam   General: Well appearing, well nourished, in no distress. Oriented x 3, normal mood and affect. HEENT:  Head: Normocephalic, atraumatic. Eyes: Conjunctiva clear, sclera non-icteric, EOM intact. Nose: No external lesions. Neck: Supple. Neurologic: Alert and oriented. No focal neurological deficits appreciated. Psychiatric: Normal mood and affect. Assessment and Plan:   Mr. Vy Johnston is a 65-year-old male with history significant for polymyalgia rheumatica with onset of symptoms in May 2023 who presents for a follow-up. He is currently on prednisone 15 mg once daily. # Polymyalgia rheumatica  - Marcus De La Paz presents today for a follow-up of polymyalgia rheumatica for which he is currently on prednisone 15 mg once daily. He was started on 17.5 mg once daily at his initial visit with me last month and reports with this dose and a gradual taper he has overall been doing well without any significant complaints. I would like him to continue the 15 mg once daily for the next 3 weeks until he returns from his vacation to [place]. If he is still doing well at that time he will decrease the prednisone further to 12.5 mg once daily until our next visit. He will update inflammatory markers prior to the follow-up appointment. I will also repeat an SPEP as this may have been abnormal secondary to the inflammatory response from polymyalgia rheumatica. - He is not reporting symptoms that would be concerning for temporal arteritis. Visit Time  Total Visit Duration: 15 minutes.

## 2023-09-09 ENCOUNTER — OFFICE VISIT (OUTPATIENT)
Dept: URGENT CARE | Facility: MEDICAL CENTER | Age: 73
End: 2023-09-09
Payer: COMMERCIAL

## 2023-09-09 VITALS
DIASTOLIC BLOOD PRESSURE: 82 MMHG | HEART RATE: 80 BPM | OXYGEN SATURATION: 95 % | WEIGHT: 218 LBS | SYSTOLIC BLOOD PRESSURE: 132 MMHG | RESPIRATION RATE: 18 BRPM | TEMPERATURE: 98.6 F | BODY MASS INDEX: 25.22 KG/M2 | HEIGHT: 78 IN

## 2023-09-09 DIAGNOSIS — J06.9 UPPER RESPIRATORY TRACT INFECTION, UNSPECIFIED TYPE: Primary | ICD-10-CM

## 2023-09-09 PROCEDURE — 99213 OFFICE O/P EST LOW 20 MIN: CPT | Performed by: PHYSICIAN ASSISTANT

## 2023-09-09 NOTE — PATIENT INSTRUCTIONS
1. Increase fluids  2. Motrin as needed for fever/body aches  3. Continue Mucinex as needed for congestion  4.  Follow up with PCP in 3-5 days if symptoms persist.

## 2023-09-09 NOTE — PROGRESS NOTES
[facility]        NAME: Cara Dick is a 72 y.o. male  : 1950    MRN: [id number]  DATE: 2023  TIME: 5:45 PM    Assessment and Plan   Upper respiratory tract infection, unspecified type [J06.9]  1. Upper respiratory tract infection, unspecified type              Patient Instructions     1. Increase fluids  2. Motrin as needed for fever/body aches  3. Continue Mucinex as needed for congestion  4. Follow up with PCP in 3-5 days if symptoms persist.      Chief Complaint     Chief Complaint   Patient presents with   • Sinus Problem     Sinus pressure, nasal congestion, low grade fever, headache; x3 days; does not want COVID testing here          History of Present Illness       Wild Hawkins a 82-year-old male who presents with a 2-day history of nasal discharge, cough, congestion and a low-grade fever. Patient denies any nausea, vomiting or diarrhea but has had chills and body aches since the onset of his symptoms. Sinus Problem  Associated symptoms include chills, congestion, coughing and sinus pressure. Review of Systems   Review of Systems   Constitutional: Positive for chills, fatigue and fever. HENT: Positive for congestion, postnasal drip, rhinorrhea and sinus pressure. Respiratory: Positive for cough. Gastrointestinal: Negative.           Current Medications       Current Outpatient Medications:   •  amLODIPine-benazepril (LOTREL 5-20) 5-20 MG per capsule, Take 1 capsule by mouth daily, Disp: 90 capsule, Rfl: 3  •  Coenzyme Q10 (CO Q 10) 10 MG CAPS, Take 1 capsule by mouth 2 (two) times a day, Disp: , Rfl:   •  hydrochlorothiazide (HYDRODIURIL) 12.5 mg tablet, TAKE 1 TABLET DAILY, Disp: 90 tablet, Rfl: 3  •  metoprolol succinate (TOPROL-XL) 25 mg 24 hr tablet, Take 1 tablet (25 mg total) by mouth daily, Disp: 90 tablet, Rfl: 3  •  multivitamin (THERAGRAN) TABS, Take 1 tablet by mouth daily, Disp: , Rfl:   •  OMEGA-3 FATTY ACIDS PO, Take 1 capsule by mouth daily , Disp: , Rfl:   •  predniSONE 10 mg tablet, , Disp: , Rfl:   •  predniSONE 2.5 mg tablet, COMBINE TO TAKE 17.5 MG ONCE DAILY. , Disp: 60 tablet, Rfl: 0  •  predniSONE 5 mg tablet, , Disp: , Rfl:   •  rosuvastatin (CRESTOR) 5 mg tablet, Take 1 tablet (5 mg total) by mouth 3 (three) times a week (Patient not taking: Reported on 7/27/2023), Disp: 60 tablet, Rfl: 3    Current Allergies     Allergies as of 09/09/2023 - Reviewed 09/09/2023   Allergen Reaction Noted   • Biaxin [clarithromycin] Other (See Comments) 09/09/2023   • Sulfa antibiotics Other (See Comments) 09/09/2023            The following portions of the patient's history were reviewed and updated as appropriate: allergies, current medications, past family history, past medical history, past social history, past surgical history and problem list.     Past Medical History:   Diagnosis Date   • AAA (abdominal aortic aneurysm) ([address])    • Arthritis     wrist and knees   • Hypertension    • Mixed hyperlipidemia    • Nontoxic single thyroid nodule        Past Surgical History:   Procedure Laterality Date   • COLONOSCOPY     • KNEE ARTHROSCOPY      therapeutic   • VASECTOMY         Family History   Problem Relation Age of Onset   • Heart attack Mother         MI   • Sudden death Mother         SCD   • Stroke Father         CVA   • Heart attack Family         acute MI   • Aneurysm Family         AAA   • Cancer Family         gastric   • Colon cancer Family    • Liver cancer Family    • Lung cancer Family    • Prostate cancer Family    • Stroke Family         syndrome         Medications have been verified. Objective   /82   Pulse 80   Temp 98.6 °F (37 °C) (Temporal)   Resp 18   Ht 6' 7" (2.007 m)   Wt 98.9 kg (218 lb)   SpO2 95%   BMI 24.56 kg/m²   No LMP for male patient. Physical Exam     Physical Exam  Constitutional:       General: He is not in acute distress. Appearance: Normal appearance. He is not ill-appearing.    HENT:      Head: Normocephalic and atraumatic. Right Ear: Tympanic membrane and ear canal normal.      Left Ear: Tympanic membrane and ear canal normal.      Nose: Congestion and rhinorrhea present. Rhinorrhea is clear. Mouth/Throat:      Lips: Pink. Pharynx: Oropharynx is clear. Cardiovascular:      Rate and Rhythm: Normal rate and regular rhythm. Heart sounds: Normal heart sounds, S1 normal and S2 normal. No murmur heard. Pulmonary:      Effort: Pulmonary effort is normal.      Breath sounds: Normal breath sounds and air entry. Neurological:      Mental Status: He is alert.

## 2023-09-26 LAB
ALBUMIN SERPL ELPH-MCNC: 3.9 G/DL (ref 3.8–4.8)
ALPHA1 GLOB SERPL ELPH-MCNC: 0.4 G/DL (ref 0.2–0.3)
ALPHA2 GLOB SERPL ELPH-MCNC: 0.9 G/DL (ref 0.5–0.9)
BETA1 GLOB SERPL ELPH-MCNC: 0.4 G/DL (ref 0.4–0.6)
BETA2 GLOB SERPL ELPH-MCNC: 0.4 G/DL (ref 0.2–0.5)
CRP SERPL-MCNC: 5.8 MG/L
ERYTHROCYTE [SEDIMENTATION RATE] IN BLOOD BY WESTERGREN METHOD: 25 MM/H
GAMMA GLOB SERPL ELPH-MCNC: 1.2 G/DL (ref 0.8–1.7)
PROT PATTERN SERPL ELPH-IMP: ABNORMAL
PROT SERPL-MCNC: 7.2 G/DL (ref 6.1–8.1)

## 2023-10-08 DIAGNOSIS — M35.3 PMR (POLYMYALGIA RHEUMATICA) (HCC): ICD-10-CM

## 2023-10-08 RX ORDER — PREDNISONE 2.5 MG/1
TABLET ORAL
Qty: 60 TABLET | Refills: 0 | OUTPATIENT
Start: 2023-10-08

## 2023-12-01 ENCOUNTER — PATIENT MESSAGE (OUTPATIENT)
Dept: CARDIOLOGY CLINIC | Facility: MEDICAL CENTER | Age: 73
End: 2023-12-01

## 2023-12-01 DIAGNOSIS — E78.2 MIXED HYPERLIPIDEMIA: ICD-10-CM

## 2023-12-01 DIAGNOSIS — I10 BENIGN ESSENTIAL HYPERTENSION: Primary | ICD-10-CM

## 2023-12-04 RX ORDER — HYDROCHLOROTHIAZIDE 12.5 MG/1
12.5 CAPSULE, GELATIN COATED ORAL DAILY
Qty: 90 CAPSULE | Refills: 3 | Status: SHIPPED | OUTPATIENT
Start: 2023-12-04 | End: 2023-12-07 | Stop reason: SDUPTHER

## 2023-12-04 RX ORDER — ROSUVASTATIN CALCIUM 5 MG/1
5 TABLET, COATED ORAL 3 TIMES WEEKLY
Qty: 60 TABLET | Refills: 3 | Status: SHIPPED | OUTPATIENT
Start: 2023-12-04 | End: 2023-12-07 | Stop reason: SDUPTHER

## 2023-12-07 ENCOUNTER — TELEPHONE (OUTPATIENT)
Dept: CARDIOLOGY CLINIC | Facility: CLINIC | Age: 73
End: 2023-12-07

## 2023-12-07 DIAGNOSIS — E78.2 MIXED HYPERLIPIDEMIA: ICD-10-CM

## 2023-12-07 DIAGNOSIS — I10 BENIGN ESSENTIAL HYPERTENSION: ICD-10-CM

## 2023-12-07 RX ORDER — ROSUVASTATIN CALCIUM 5 MG/1
5 TABLET, COATED ORAL 3 TIMES WEEKLY
Qty: 36 TABLET | Refills: 3 | Status: SHIPPED | OUTPATIENT
Start: 2023-12-08

## 2023-12-07 RX ORDER — HYDROCHLOROTHIAZIDE 12.5 MG/1
12.5 CAPSULE, GELATIN COATED ORAL DAILY
Qty: 90 CAPSULE | Refills: 3 | Status: SHIPPED | OUTPATIENT
Start: 2023-12-07

## 2023-12-07 NOTE — TELEPHONE ENCOUNTER
Michael Barber [birth year] I left messages earlier regarding the fact that new scripts were put against the wrong prescription. Drug company. [email or web address] has been after me to tell me that they can't fill these sent to [facility] instead of [facility] plan. I left information for that, but the Pharmacy Help Desk for providers for Reese Ledesma is [phone] I believe. [address] put up new scripts for hydrochlorothiazide, Amlodipine, benazepril and rosuvastatin. Call me at [phone] to confirm that was straightened out. Thank you.

## 2024-02-08 DIAGNOSIS — I10 HYPERTENSION, UNSPECIFIED TYPE: ICD-10-CM

## 2024-02-08 RX ORDER — AMLODIPINE BESYLATE AND BENAZEPRIL HYDROCHLORIDE 5; 20 MG/1; MG/1
1 CAPSULE ORAL DAILY
Qty: 90 CAPSULE | Refills: 3 | Status: SHIPPED | OUTPATIENT
Start: 2024-02-08 | End: 2024-02-08 | Stop reason: SDUPTHER

## 2024-02-09 RX ORDER — AMLODIPINE BESYLATE AND BENAZEPRIL HYDROCHLORIDE 5; 20 MG/1; MG/1
1 CAPSULE ORAL DAILY
Qty: 90 CAPSULE | Refills: 1 | Status: SHIPPED | OUTPATIENT
Start: 2024-02-09

## 2024-02-09 RX ORDER — METOPROLOL SUCCINATE 25 MG/1
25 TABLET, EXTENDED RELEASE ORAL DAILY
Qty: 90 TABLET | Refills: 1 | Status: SHIPPED | OUTPATIENT
Start: 2024-02-09

## 2024-05-15 ENCOUNTER — OFFICE VISIT (OUTPATIENT)
Dept: URGENT CARE | Facility: MEDICAL CENTER | Age: 74
End: 2024-05-15
Payer: MEDICARE

## 2024-05-15 VITALS
DIASTOLIC BLOOD PRESSURE: 82 MMHG | TEMPERATURE: 98.3 F | RESPIRATION RATE: 18 BRPM | HEART RATE: 74 BPM | SYSTOLIC BLOOD PRESSURE: 138 MMHG | OXYGEN SATURATION: 96 % | BODY MASS INDEX: 25.46 KG/M2 | WEIGHT: 226 LBS

## 2024-05-15 DIAGNOSIS — S81.811A LACERATION OF RIGHT LOWER EXTREMITY, INITIAL ENCOUNTER: Primary | ICD-10-CM

## 2024-05-15 PROCEDURE — G0463 HOSPITAL OUTPT CLINIC VISIT: HCPCS | Performed by: PHYSICIAN ASSISTANT

## 2024-05-15 PROCEDURE — 99213 OFFICE O/P EST LOW 20 MIN: CPT | Performed by: PHYSICIAN ASSISTANT

## 2024-05-15 RX ORDER — CEPHALEXIN 500 MG/1
500 CAPSULE ORAL EVERY 6 HOURS SCHEDULED
Qty: 28 CAPSULE | Refills: 0 | Status: SHIPPED | OUTPATIENT
Start: 2024-05-15 | End: 2024-05-15 | Stop reason: SDUPTHER

## 2024-05-15 RX ORDER — METHOTREXATE 2.5 MG/1
TABLET ORAL
COMMUNITY
Start: 2024-05-01

## 2024-05-15 RX ORDER — FOLIC ACID 1 MG/1
TABLET ORAL
COMMUNITY
Start: 2024-03-27

## 2024-05-15 RX ORDER — POTASSIUM CHLORIDE 1125 MG/1
TABLET, EXTENDED RELEASE ORAL
COMMUNITY
Start: 2023-10-28

## 2024-05-15 RX ORDER — CEPHALEXIN 500 MG/1
500 CAPSULE ORAL EVERY 6 HOURS SCHEDULED
Qty: 28 CAPSULE | Refills: 0 | Status: SHIPPED | OUTPATIENT
Start: 2024-05-15 | End: 2024-05-22

## 2024-05-15 NOTE — PATIENT INSTRUCTIONS
Right leg laceration  Keflex as directed  Return in 10 days for suture removal  Change dressing twice daily  Follow up with PCP in 3-5 days.  Proceed to  ER if symptoms worsen.

## 2024-05-15 NOTE — PROGRESS NOTES
[facility]        NAME: El Smith is a 74 y.o. male  : 1950    MRN: [id number]  DATE: May 15, 2024  TIME: 11:16 AM    Assessment and Plan   Laceration of right lower extremity, initial encounter [S81.811A]  1. Laceration of right lower extremity, initial encounter              Patient Instructions     Right leg laceration  Keflex as directed  Return in 10 days for suture removal  Change dressing twice daily  Follow up with PCP in 3-5 days.  Proceed to  ER if symptoms worsen.    Chief Complaint     Chief Complaint   Patient presents with    Laceration     Pt. With a laceration to his right lower leg from his car last night.          History of Present Illness       74-year-old male who presents complaining of laceration to the right leg.  Patient states that he went into his car and the glove compartment was open and he is scraped the leg against the edge of the glove compartment.  Patient states his tetanus injection is up-to-date.  Denies head trauma, loss of consciousness.    Laceration         Review of Systems   Review of Systems   Constitutional: Negative.    HENT: Negative.     Eyes: Negative.    Respiratory: Negative.  Negative for apnea, cough, choking, chest tightness, shortness of breath, wheezing and stridor.    Cardiovascular: Negative.  Negative for chest pain.   Skin:  Positive for wound.         Current Medications       Current Outpatient Medications:     amLODIPine-benazepril (LOTREL 5-20) 5-20 MG per capsule, Take 1 capsule by mouth daily, Disp: 90 capsule, Rfl: 1    Coenzyme Q10 (CO Q 10) 10 MG CAPS, Take 1 capsule by mouth 2 (two) times a day, Disp: , Rfl:     folic acid (FOLVITE) 1 mg tablet, take 1 tablet by mouth every day for 30 days, Disp: , Rfl:     hydrochlorothiazide (MICROZIDE) 12.5 mg capsule, Take 1 capsule (12.5 mg total) by mouth daily, Disp: 90 capsule, Rfl: 3    Klor-Con M15 15 MEQ TBCR, , Disp: , Rfl:     methotrexate 2.5 MG tablet, TAKE 4 TABLETS EVERY  WEEK BY ORAL ROUTE FOR 84 DAYS., Disp: , Rfl:     metoprolol succinate (TOPROL-XL) 25 mg 24 hr tablet, Take 1 tablet (25 mg total) by mouth daily, Disp: 90 tablet, Rfl: 1    multivitamin (THERAGRAN) TABS, Take 1 tablet by mouth daily, Disp: , Rfl:     OMEGA-3 FATTY ACIDS PO, Take 1 capsule by mouth daily , Disp: , Rfl:     predniSONE 2.5 mg tablet, COMBINE TO TAKE 17.5 MG ONCE DAILY., Disp: 60 tablet, Rfl: 0    rosuvastatin (CRESTOR) 5 mg tablet, Take 1 tablet (5 mg total) by mouth 3 (three) times a week, Disp: 36 tablet, Rfl: 3    predniSONE 10 mg tablet, , Disp: , Rfl:     predniSONE 5 mg tablet, , Disp: , Rfl:     Current Allergies     Allergies as of 05/15/2024 - Reviewed 05/15/2024   Allergen Reaction Noted    Biaxin [clarithromycin] Other (See Comments) 09/09/2023    Sulfa antibiotics Other (See Comments) 09/09/2023            The following portions of the patient's history were reviewed and updated as appropriate: allergies, current medications, past family history, past medical history, past social history, past surgical history and problem list.     Past Medical History:   Diagnosis Date    AAA (abdominal aortic aneurysm) (HCC)     Arthritis     wrist and knees    Hypertension     Mixed hyperlipidemia     Nontoxic single thyroid nodule        Past Surgical History:   Procedure Laterality Date    COLONOSCOPY      KNEE ARTHROSCOPY      therapeutic    VASECTOMY         Family History   Problem Relation Age of Onset    Heart attack Mother         MI    Sudden death Mother         SCD    Stroke Father         CVA    Heart attack Family         acute MI    Aneurysm Family         AAA    Cancer Family         gastric    Colon cancer Family     Liver cancer Family     Lung cancer Family     Prostate cancer Family     Stroke Family         syndrome         Medications have been verified.        Objective   /82   Pulse 74   Temp 98.3 °F (36.8 °C)   Resp 18   Wt 103 kg (226 lb)   SpO2 96%   BMI 25.46 kg/m²         Physical Exam     Physical Exam  Constitutional:       General: He is not in acute distress.     Appearance: He is well-developed. He is not diaphoretic.   Cardiovascular:      Rate and Rhythm: Normal rate and regular rhythm.      Heart sounds: Normal heart sounds.   Pulmonary:      Effort: Pulmonary effort is normal. No respiratory distress.      Breath sounds: Normal breath sounds. No wheezing or rales.   Chest:      Chest wall: No tenderness.   Musculoskeletal:      Cervical back: Normal range of motion and neck supple.   Lymphadenopathy:      Cervical: No cervical adenopathy.   Skin:               Area cleaned and prepped in sterile fashion, anesthetized with 1% lidocaine, 1 cc used.  Sutured with 3-0 Ethilon, 5 sutures applied with no complication.  Patient tolerated procedure well

## 2024-05-26 ENCOUNTER — OFFICE VISIT (OUTPATIENT)
Dept: URGENT CARE | Facility: MEDICAL CENTER | Age: 74
End: 2024-05-26
Payer: MEDICARE

## 2024-05-26 VITALS
OXYGEN SATURATION: 98 % | RESPIRATION RATE: 18 BRPM | SYSTOLIC BLOOD PRESSURE: 122 MMHG | HEIGHT: 78 IN | HEART RATE: 75 BPM | BODY MASS INDEX: 26.15 KG/M2 | TEMPERATURE: 98.9 F | WEIGHT: 226 LBS | DIASTOLIC BLOOD PRESSURE: 72 MMHG

## 2024-05-26 DIAGNOSIS — Z48.02 ENCOUNTER FOR REMOVAL OF SUTURES: Primary | ICD-10-CM

## 2024-05-26 PROCEDURE — 99213 OFFICE O/P EST LOW 20 MIN: CPT | Performed by: PHYSICIAN ASSISTANT

## 2024-05-26 PROCEDURE — G0463 HOSPITAL OUTPT CLINIC VISIT: HCPCS | Performed by: PHYSICIAN ASSISTANT

## 2024-05-26 NOTE — PROGRESS NOTES
[facility]        NAME: El Smith is a 74 y.o. male  : 1950    MRN: [id number]  DATE: May 26, 2024  TIME: 3:08 PM    Assessment and Plan   Encounter for removal of sutures [Z48.02]  1. Encounter for removal of sutures              Patient Instructions     Keep skin clean and dry  Watch for S&S of infection (redness, swelling, drainage, fever)  Apply topical antibiotics and dressing daily until fully healed.   Follow-up as needed.       If tests have been performed at [facility], our office will contact you with results if changes need to be made to the care plan discussed with you at the visit.  [facility] on [facility].    Chief Complaint     Chief Complaint   Patient presents with    Suture / Staple Removal     Suture removal to right leg          History of Present Illness       Gera is a 74-year-old male who presents for suture removal.  Patient had 5 sutures placed in his right lower leg approximately 11 days prior.  He reports the area is healing well, he has had no redness, swelling or discharge.    Suture / Staple Removal        Review of Systems   Review of Systems   Constitutional:  Negative for fever.   Musculoskeletal: Negative.    Skin:  Positive for wound. Negative for color change.         Current Medications       Current Outpatient Medications:     amLODIPine-benazepril (LOTREL 5-20) 5-20 MG per capsule, Take 1 capsule by mouth daily, Disp: 90 capsule, Rfl: 1    Coenzyme Q10 (CO Q 10) 10 MG CAPS, Take 1 capsule by mouth 2 (two) times a day, Disp: , Rfl:     folic acid (FOLVITE) 1 mg tablet, take 1 tablet by mouth every day for 30 days, Disp: , Rfl:     hydrochlorothiazide (MICROZIDE) 12.5 mg capsule, Take 1 capsule (12.5 mg total) by mouth daily, Disp: 90 capsule, Rfl: 3    Klor-Con M15 15 MEQ TBCR, , Disp: , Rfl:     methotrexate 2.5 MG tablet, TAKE 4 TABLETS EVERY WEEK BY ORAL ROUTE FOR 84 DAYS., Disp: , Rfl:     metoprolol succinate (TOPROL-XL)  "25 mg 24 hr tablet, Take 1 tablet (25 mg total) by mouth daily, Disp: 90 tablet, Rfl: 1    multivitamin (THERAGRAN) TABS, Take 1 tablet by mouth daily, Disp: , Rfl:     OMEGA-3 FATTY ACIDS PO, Take 1 capsule by mouth daily , Disp: , Rfl:     predniSONE 10 mg tablet, , Disp: , Rfl:     predniSONE 2.5 mg tablet, COMBINE TO TAKE 17.5 MG ONCE DAILY., Disp: 60 tablet, Rfl: 0    predniSONE 5 mg tablet, , Disp: , Rfl:     rosuvastatin (CRESTOR) 5 mg tablet, Take 1 tablet (5 mg total) by mouth 3 (three) times a week, Disp: 36 tablet, Rfl: 3    Current Allergies     Allergies as of 05/26/2024 - Reviewed 05/26/2024   Allergen Reaction Noted    Biaxin [clarithromycin] Other (See Comments) 09/09/2023    Sulfa antibiotics Other (See Comments) 09/09/2023            The following portions of the patient's history were reviewed and updated as appropriate: allergies, current medications, past family history, past medical history, past social history, past surgical history and problem list.     Past Medical History:   Diagnosis Date    AAA (abdominal aortic aneurysm) (HCC)     Arthritis     wrist and knees    Hypertension     Mixed hyperlipidemia     Nontoxic single thyroid nodule        Past Surgical History:   Procedure Laterality Date    COLONOSCOPY      KNEE ARTHROSCOPY      therapeutic    VASECTOMY         Family History   Problem Relation Age of Onset    Heart attack Mother         MI    Sudden death Mother         SCD    Stroke Father         CVA    Heart attack Family         acute MI    Aneurysm Family         AAA    Cancer Family         gastric    Colon cancer Family     Liver cancer Family     Lung cancer Family     Prostate cancer Family     Stroke Family         syndrome         Medications have been verified.        Objective   /72   Pulse 75   Temp 98.9 °F (37.2 °C) (Temporal)   Resp 18   Ht 6' 7\" (2.007 m)   Wt 103 kg (226 lb)   SpO2 98%   BMI 25.46 kg/m²   No LMP for male patient.       Physical Exam "     Physical Exam  Skin:     Comments: Healing right lower leg laceration and skin avulsion.  There are 5 sutures placed.  Tissue appears to be granulating well, there is no redness, swelling or drainage.  5 sutures were removed without complications.  Tincture of benzoin and Steri-Strips were applied as well as a dressing.

## 2024-05-26 NOTE — PATIENT INSTRUCTIONS
Keep skin clean and dry  Watch for S&S of infection (redness, swelling, drainage, fever)  Apply topical antibiotics and dressing daily until fully healed.   Follow-up as needed.

## 2024-06-20 ENCOUNTER — OFFICE VISIT (OUTPATIENT)
Dept: CARDIOLOGY CLINIC | Facility: MEDICAL CENTER | Age: 74
End: 2024-06-20
Payer: MEDICARE

## 2024-06-20 VITALS
DIASTOLIC BLOOD PRESSURE: 80 MMHG | BODY MASS INDEX: 25.92 KG/M2 | HEART RATE: 78 BPM | WEIGHT: 224 LBS | SYSTOLIC BLOOD PRESSURE: 124 MMHG | HEIGHT: 78 IN | OXYGEN SATURATION: 95 %

## 2024-06-20 DIAGNOSIS — I34.0 NONRHEUMATIC MITRAL VALVE REGURGITATION: ICD-10-CM

## 2024-06-20 DIAGNOSIS — I71.21 ANEURYSM OF ASCENDING AORTA WITHOUT RUPTURE (HCC): ICD-10-CM

## 2024-06-20 DIAGNOSIS — E78.2 MIXED HYPERLIPIDEMIA: ICD-10-CM

## 2024-06-20 DIAGNOSIS — I25.10 CORONARY ARTERY DISEASE DUE TO CALCIFIED CORONARY LESION: ICD-10-CM

## 2024-06-20 DIAGNOSIS — I25.84 CORONARY ARTERY DISEASE DUE TO CALCIFIED CORONARY LESION: ICD-10-CM

## 2024-06-20 DIAGNOSIS — I34.1 MVP (MITRAL VALVE PROLAPSE): Primary | ICD-10-CM

## 2024-06-20 DIAGNOSIS — I10 BENIGN ESSENTIAL HYPERTENSION: ICD-10-CM

## 2024-06-20 PROCEDURE — 93000 ELECTROCARDIOGRAM COMPLETE: CPT | Performed by: INTERNAL MEDICINE

## 2024-06-20 PROCEDURE — 99214 OFFICE O/P EST MOD 30 MIN: CPT | Performed by: INTERNAL MEDICINE

## 2024-06-20 NOTE — ASSESSMENT & PLAN NOTE
BP Readings from Last 3 Encounters:   06/20/24 124/80   05/26/24 122/72   05/15/24 138/82   Continue current medications.  Lifestyle modification.  Close blood pressure monitoring.

## 2024-06-20 NOTE — ASSESSMENT & PLAN NOTE
Statins began at last visit at a low dose.  Follow-up annual lipid profile with PCP.   Mastoid Interpolation Flap Text: A decision was made to reconstruct the defect utilizing an interpolation axial flap and a staged reconstruction.  A telfa template was made of the defect.  This telfa template was then used to outline the mastoid interpolation flap.  The donor area for the pedicle flap was then injected with anesthesia.  The flap was excised through the skin and subcutaneous tissue down to the layer of the underlying musculature.  The pedicle flap was carefully excised within this deep plane to maintain its blood supply.  The edges of the donor site were undermined.   The donor site was closed in a primary fashion.  The pedicle was then rotated into position and sutured.  Once the tube was sutured into place, adequate blood supply was confirmed with blanching and refill.  The pedicle was then wrapped with xeroform gauze and dressed appropriately with a telfa and gauze bandage to ensure continued blood supply and protect the attached pedicle.

## 2024-06-20 NOTE — PATIENT INSTRUCTIONS
Echo planned.  Continue current cardiac medications.    Report any worsening cardiac symptoms (chest pain,shortness of breath with exertion or fainting).  Keep follow-up as planned.

## 2024-06-20 NOTE — ASSESSMENT & PLAN NOTE
Ascending aorta 43 mm on chest CT in 4/23.  No significant change from 2017.  Reevaluate aortic size on echo.

## 2024-06-20 NOTE — PROGRESS NOTES
[facility] [place] [address]  [address]  [facility] [address]  Phone#  [phone]  Fax#  [phone]  [facility]iology Office Follow-up Visit             NAME: El Smith  AGE: 74 y.o. SEX: male   : 1950   MRN: [id number]    DATE: 2024  TIME: 1:19 PM    Cardiology Problem list:  Coronary artery disease: Calcium score 93, calcium localized to left main  Mitral valve prolapse with MR: : Echo EF 65, PML prolapse, moderate MR  Ascending aortic aneurysm  :  ascending thoracic aorta 43 mm, no change since 2017.  : Echo ascending aorta 4.1 cm  Hypertension with LVH  Dyslipidemia    Assessment and recommendations    MVP (mitral valve prolapse)  Echo in  showed posterior mitral leaflet prolapse.  Moderate mitral regurgitation was seen.  Repeat echo requested. Grade 3 apical SM on exam.  Mitral prolapse and MR findings reviewed with the patient.    Coronary artery disease due to calcified coronary lesion  Calcium score 93.  Calcium localized mainly to the left main.  No anginal symptoms.  Continue statin therapy.    Aneurysm, ascending aorta (HCC)  Ascending aorta 43 mm on chest CT in .  No significant change from 2017.  Reevaluate aortic size on echo.    Mixed hyperlipidemia  Statins began at last visit at a low dose.  Follow-up annual lipid profile with PCP.    Benign essential hypertension  BP Readings from Last 3 Encounters:   24 124/80   24 122/72   05/15/24 138/82   Continue current medications.  Lifestyle modification.  Close blood pressure monitoring.        Chief Complaint   Patient presents with    Follow-up     1 year f/up       HPI:    El Smith is a 74 y.o.-year-old male who presents to the cardiology clinic for follow up for the above-listed problems.  He has a history of hypertension, aortic aneurysm, LVH and hyperlipidemia. Calcium score was 93.  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  He has stage II hypertension requiring 4 medications.  He is now tolerating low intensity statin therapy.  Patient is doing well from a cardiovascular standpoint.    No recent cardiac hospitalizations.    Current medications reviewed.    Reports compliance to medicines.  No side effects reported.  Denies chest pain on exertion.  Denies worsening shortness of breath.  Denies sustained palpitations.        Past history, family history, social history, current medications, vital signs, recent lab and imaging studies and  prior cardiology studies reviewed independently on this visit.    Allergies   Allergen Reactions    Biaxin [Clarithromycin] Other (See Comments)     Gi intolerance     Sulfa Antibiotics Other (See Comments)     GI intolerance          Current Outpatient Medications:     amLODIPine-benazepril (LOTREL 5-20) 5-20 MG per capsule, Take 1 capsule by mouth daily, Disp: 90 capsule, Rfl: 1    folic acid (FOLVITE) 1 mg tablet, take 1 tablet by mouth every day for 30 days, Disp: , Rfl:     hydrochlorothiazide (MICROZIDE) 12.5 mg capsule, Take 1 capsule (12.5 mg total) by mouth daily, Disp: 90 capsule, Rfl: 3    Klor-Con M15 15 MEQ TBCR, , Disp: , Rfl:     metoprolol succinate (TOPROL-XL) 25 mg 24 hr tablet, Take 1 tablet (25 mg total) by mouth daily, Disp: 90 tablet, Rfl: 1    multivitamin (THERAGRAN) TABS, Take 1 tablet by mouth daily, Disp: , Rfl:     OMEGA-3 FATTY ACIDS PO, Take 1 capsule by mouth daily , Disp: , Rfl:     rosuvastatin (CRESTOR) 5 mg tablet, Take 1 tablet (5 mg total) by mouth 3 (three) times a week, Disp: 36 tablet, Rfl: 3    Review of Systems   Constitutional:  Negative for fatigue.   HENT: Negative.     Eyes: Negative.    Respiratory:  Negative for shortness of breath.    Cardiovascular:  Negative for chest pain, palpitations and leg swelling.   Gastrointestinal: Negative.    Endocrine: Negative.    Neurological:  Negative for syncope.   Hematological: Negative.    All other systems  "reviewed and are negative.      Objective:     Vitals:    06/20/24 1146   BP: 124/80   Pulse: 78   SpO2: 95%     Wt Readings from Last 3 Encounters:   06/20/24 102 kg (224 lb)   05/26/24 103 kg (226 lb)   05/15/24 103 kg (226 lb)     Pulse Readings from Last 3 Encounters:   06/20/24 78   05/26/24 75   05/15/24 74     BP Readings from Last 3 Encounters:   06/20/24 124/80   05/26/24 122/72   05/15/24 138/82     Physical Exam  Vitals reviewed.   Constitutional:       General: He is not in acute distress.  HENT:      Head: Normocephalic.   Neck:      Vascular: No carotid bruit.   Cardiovascular:      Rate and Rhythm: Normal rate and regular rhythm.      Heart sounds: S1 normal and S2 normal. Murmur heard.      Crescendo systolic murmur is present with a grade of 3/6.      Comments: Apical murmur  Pulmonary:      Breath sounds: No wheezing or rhonchi.   Musculoskeletal:      Right lower leg: No edema.      Left lower leg: No edema.   Skin:     General: Skin is warm.      Findings: Lesion present.   Neurological:      Mental Status: He is alert. Mental status is at baseline.   Psychiatric:         Mood and Affect: Mood normal.         Pertinent Laboratory/Diagnostic Studies:    Laboratory studies reviewed personally by Gianni Chaudhary MD    BMP:       Lipid Profile:   Lab Results   Component Value Date    HDL 31 (L) 05/07/2021     Lab Results   Component Value Date    LDLCALC 102 (H) 05/07/2021     Lab Results   Component Value Date    TRIG 119 05/07/2021      Other labs:  Lab Results   Component Value Date    FHA9JKUYQPME 1.38 06/03/2017    TSH 0.82 08/14/2023     Lab Results   Component Value Date    ALT 15 08/24/2023    AST 16 08/24/2023         Imaging Studies:     Pertinent imaging studies and cardiac studies were independently reviewed on this visit and findings summarized.    Gianni Chaudhary MD, Merged with [facility]    Portions of the record may have been created with voice recognition software.  Occasional wrong word or \"sound alike\" " substitutions may have occurred due to the inherent limitations of voice recognition software.  Read the chart carefully and recognize, using context, where substitutions have occurred. Please reach out to me directly for any clarifications.

## 2024-06-20 NOTE — ASSESSMENT & PLAN NOTE
Echo in 5/23 showed posterior mitral leaflet prolapse.  Moderate mitral regurgitation was seen.  Repeat echo requested. Grade 3 apical SM on exam.  Mitral prolapse and MR findings reviewed with the patient.

## 2024-07-25 DIAGNOSIS — I10 HYPERTENSION, UNSPECIFIED TYPE: ICD-10-CM

## 2024-07-25 RX ORDER — AMLODIPINE BESYLATE AND BENAZEPRIL HYDROCHLORIDE 5; 20 MG/1; MG/1
1 CAPSULE ORAL DAILY
Qty: 100 CAPSULE | Refills: 0 | Status: SHIPPED | OUTPATIENT
Start: 2024-07-25

## 2024-08-08 ENCOUNTER — APPOINTMENT (OUTPATIENT)
Dept: RADIOLOGY | Facility: MEDICAL CENTER | Age: 74
End: 2024-08-08
Payer: MEDICARE

## 2024-08-08 ENCOUNTER — OFFICE VISIT (OUTPATIENT)
Dept: OBGYN CLINIC | Facility: MEDICAL CENTER | Age: 74
End: 2024-08-08
Payer: MEDICARE

## 2024-08-08 VITALS — WEIGHT: 224 LBS | BODY MASS INDEX: 25.92 KG/M2 | HEIGHT: 78 IN

## 2024-08-08 DIAGNOSIS — M25.561 CHRONIC PAIN OF RIGHT KNEE: ICD-10-CM

## 2024-08-08 DIAGNOSIS — M17.11 OSTEOARTHRITIS OF RIGHT PATELLOFEMORAL JOINT: Primary | ICD-10-CM

## 2024-08-08 DIAGNOSIS — G89.29 CHRONIC PAIN OF RIGHT KNEE: ICD-10-CM

## 2024-08-08 PROCEDURE — 20610 DRAIN/INJ JOINT/BURSA W/O US: CPT | Performed by: PHYSICAL MEDICINE & REHABILITATION

## 2024-08-08 PROCEDURE — 99204 OFFICE O/P NEW MOD 45 MIN: CPT | Performed by: PHYSICAL MEDICINE & REHABILITATION

## 2024-08-08 PROCEDURE — 73562 X-RAY EXAM OF KNEE 3: CPT

## 2024-08-08 RX ORDER — ROPIVACAINE HYDROCHLORIDE 5 MG/ML
10 INJECTION, SOLUTION EPIDURAL; INFILTRATION; PERINEURAL
Status: COMPLETED | OUTPATIENT
Start: 2024-08-08 | End: 2024-08-08

## 2024-08-08 RX ORDER — TRIAMCINOLONE ACETONIDE 40 MG/ML
80 INJECTION, SUSPENSION INTRA-ARTICULAR; INTRAMUSCULAR
Status: COMPLETED | OUTPATIENT
Start: 2024-08-08 | End: 2024-08-08

## 2024-08-08 RX ADMIN — ROPIVACAINE HYDROCHLORIDE 10 ML: 5 INJECTION, SOLUTION EPIDURAL; INFILTRATION; PERINEURAL at 15:15

## 2024-08-08 RX ADMIN — TRIAMCINOLONE ACETONIDE 80 MG: 40 INJECTION, SUSPENSION INTRA-ARTICULAR; INTRAMUSCULAR at 15:15

## 2024-08-08 NOTE — PROGRESS NOTES
1. Osteoarthritis of right patellofemoral joint    2. Chronic pain of right knee      Orders Placed This Encounter   Procedures   • Large joint arthrocentesis: R knee   • XR knee 3 vw right non injury     No orders of the defined types were placed in this encounter.    Impression:  Right knee pain and effusion likely secondary to patellofemoral osteoarthritis.   Patient had previous distal quadriceps tendon injury which healed conservatively.  Has full range of motion today with full strength with resisted knee extension.  We discussed different treatment options and decided to proceed with an intra-articular steroid injection which should help with his pain symptoms and also help with the effusion that he has.  I would like to see Gera lópez in 4-5 weeks if having symptoms.    Imaging Studies (I personally reviewed images in PACS and report):  Right knee x-rays most recent to this encounter reviewed.  These images show mild patellofemoral osteoarthritis.  There are calcifications superior to the patella in keeping with previous quadricep injury.  He also has a joint effusion.    No follow-ups on file.    Patient is in agreement with the above plan.    HPI:  El Smith is a 74 y.o. male  who presents for evaluation of   Chief Complaint   Patient presents with   • Right Knee - Pain       Onset/Mechanism: Pain started October 2023 after he twisted his knee while carrying a leaf blower.  Location: Below the knee cap.  Radiation: Denies.  Provocative: Stairs.  Severity: Painful.  Associated Symptoms: Swelling.  Treatment so far: Physical therapy.    Following history reviewed and updated:  Past Medical History:   Diagnosis Date   • AAA (abdominal aortic aneurysm) (HCC)    • Arthritis     wrist and knees   • Hypertension    • Mixed hyperlipidemia    • Nontoxic single thyroid nodule      Past Surgical History:   Procedure Laterality Date   • COLONOSCOPY     • KNEE ARTHROSCOPY      therapeutic   • VASECTOMY    "    Social History   Social History     Substance and Sexual Activity   Alcohol Use Not Currently    Comment: social     Social History     Substance and Sexual Activity   Drug Use No     Social History     Tobacco Use   Smoking Status Former   • Current packs/day: 0.00   • Average packs/day: 0.5 packs/day for 25.0 years (12.5 ttl pk-yrs)   • Types: Cigarettes   • Start date:    • Quit date:    • Years since quittin.6   Smokeless Tobacco Former     Family History   Problem Relation Age of Onset   • Heart attack Mother         MI   • Sudden death Mother         SCD   • Stroke Father         CVA   • Heart attack Family         acute MI   • Aneurysm Family         AAA   • Cancer Family         gastric   • Colon cancer Family    • Liver cancer Family    • Lung cancer Family    • Prostate cancer Family    • Stroke Family         syndrome     Allergies   Allergen Reactions   • Biaxin [Clarithromycin] Other (See Comments)     Gi intolerance    • Sulfa Antibiotics Other (See Comments)     GI intolerance           Constitutional:  Ht 6' 7\" (2.007 m)   Wt 102 kg (224 lb)   BMI 25.23 kg/m²    General: NAD.  Eyes: Anicteric sclerae.  Neck: Supple.  Lungs: Unlabored breathing.  Cardiovascular: No lower extremity edema.  Skin: Intact without erythema.  Neurologic: Sensation intact to light touch.  Psychiatric: Mood and affect are appropriate.    Right Knee Exam     Tenderness   The patient is experiencing tenderness in the patellar tendon and medial retinaculum.    Range of Motion   Extension:  normal   Flexion:  normal     Tests   Varus: negative Valgus: negative    Other   Erythema: absent  Scars: absent  Sensation: normal  Pulse: present  Swelling: mild  Effusion: effusion present           Large joint arthrocentesis: R knee  Universal Protocol:  Consent: Verbal consent obtained. Written consent not obtained.  Risks and benefits: risks, benefits and alternatives were discussed  Consent given by: patient  Timeout " called at: 8/8/2024 3:30 PM.  Patient understanding: patient states understanding of the procedure being performed  Patient consent: the patient's understanding of the procedure matches consent given  Site marked: the operative site was marked  Radiology Images displayed and confirmed. If images not available, report reviewed: imaging studies available  Patient identity confirmed: verbally with patient  Supporting Documentation  Indications: pain   Procedure Details  Location: knee - R knee  Needle size: 22 G  Ultrasound guidance: no  Approach: Inferolateral to patella.  Medications administered: 80 mg triamcinolone acetonide 40 mg/mL; 10 mL ropivacaine 0.5 %    Patient tolerance: patient tolerated the procedure well with no immediate complications  Dressing:  Sterile dressing applied    There was little to no resistance encountered during the injection.    Risks of this procedure include:    - Risk of bleeding since a needle is involved.  - Risk of infection (1/10,000 chance as per recent studies).  Signs/symptoms were discussed and they would prompt an urgent evaluation at an emergency department.  - Risk of pigmentation or skin dimpling in the skin (2-3% chance as per recent studies) from the steroid.  - Risk of increased pain from steroid flare (1% chance as per recent studies) that typically lasts 24-48 hours.  - Risk of increased blood sugars from the steroid medication that can last for a few weeks.  If the patient is a diabetic or pre-diabetic, they were encouraged to closely monitor their blood sugars and discuss with PCP if elevated more than usual or if having symptoms.    The benefits outweigh the risks and so the procedure was completed.                   Spontaneous, unlabored and symmetrical

## 2024-09-02 DIAGNOSIS — I10 HYPERTENSION, UNSPECIFIED TYPE: ICD-10-CM

## 2024-09-03 RX ORDER — METOPROLOL SUCCINATE 25 MG/1
25 TABLET, EXTENDED RELEASE ORAL DAILY
Qty: 90 TABLET | Refills: 1 | Status: SHIPPED | OUTPATIENT
Start: 2024-09-03

## 2024-09-12 ENCOUNTER — OFFICE VISIT (OUTPATIENT)
Dept: OBGYN CLINIC | Facility: MEDICAL CENTER | Age: 74
End: 2024-09-12
Payer: MEDICARE

## 2024-09-12 VITALS — BODY MASS INDEX: 25.92 KG/M2 | HEIGHT: 78 IN | WEIGHT: 224 LBS

## 2024-09-12 DIAGNOSIS — M17.11 OSTEOARTHRITIS OF RIGHT PATELLOFEMORAL JOINT: Primary | ICD-10-CM

## 2024-09-12 PROCEDURE — 99213 OFFICE O/P EST LOW 20 MIN: CPT | Performed by: PHYSICAL MEDICINE & REHABILITATION

## 2024-09-12 NOTE — PROGRESS NOTES
1. Osteoarthritis of right patellofemoral joint          No orders of the defined types were placed in this encounter.    Impression:  Patient is here in follow up of right knee pain and effusion likely secondary to patellofemoral osteoarthritis.   Patient had previous distal quadriceps tendon injury which healed conservatively.  He continues to have full range of motion today with full strength with resisted knee extension.  Treatment has included intra-articular steroid injection which has helped.  He will continue HEP after going through PT at [facility].      Imaging Studies (I personally reviewed images in PACS and report):  Right knee x-rays most recent to this encounter reviewed.  These images show mild patellofemoral osteoarthritis.  There are calcifications superior to the patella in keeping with previous quadricep injury.  He also has a joint effusion.    Return if symptoms worsen or fail to improve.    Patient is in agreement with the above plan.    HPI:  El Smith is a 74 y.o. male  who presents in follow up.  Here for   Chief Complaint   Patient presents with    Right Knee - Follow-up, Swelling     Pt reports improvement in symptoms since last visit.        Since last visit: See above.    Following history reviewed and updated:  Past Medical History:   Diagnosis Date    AAA (abdominal aortic aneurysm) (HCC)     Arthritis     wrist and knees    Hypertension     Mixed hyperlipidemia     Nontoxic single thyroid nodule      Past Surgical History:   Procedure Laterality Date    COLONOSCOPY      KNEE ARTHROSCOPY      therapeutic    VASECTOMY       Social History   Social History     Substance and Sexual Activity   Alcohol Use Not Currently    Comment: social     Social History     Substance and Sexual Activity   Drug Use No     Social History     Tobacco Use   Smoking Status Former    Current packs/day: 0.00    Average packs/day: 0.5 packs/day for 25.0 years (12.5 ttl pk-yrs)    Types: Cigarettes     "Start date:     Quit date:     Years since quittin.7   Smokeless Tobacco Former     Family History   Problem Relation Age of Onset    Heart attack Mother         MI    Sudden death Mother         SCD    Stroke Father         CVA    Heart attack Family         acute MI    Aneurysm Family         AAA    Cancer Family         gastric    Colon cancer Family     Liver cancer Family     Lung cancer Family     Prostate cancer Family     Stroke Family         syndrome     Allergies   Allergen Reactions    Biaxin [Clarithromycin] Other (See Comments)     Gi intolerance     Sulfa Antibiotics Other (See Comments)     GI intolerance           Constitutional:  Ht 6' 7\" (2.007 m)   Wt 102 kg (224 lb)   BMI 25.23 kg/m²    General: NAD.  Eyes: Clear sclerae.  ENT: No inflammation, lesion, or mass of lips.  No tracheal deviation.  Musculoskeletal: As mentioned below.  Integumentary: No visible rashes or skin lesions.  Pulmonary/Chest: Effort normal. No respiratory distress.   Neuro: CN's grossly intact, VAUGHAN.  Psych: Normal affect and judgement.  Vascular: WWP.    Right Knee Exam     Tenderness   The patient is experiencing tenderness in the medial retinaculum.    Range of Motion   Extension:  normal     Tests   Varus: negative Valgus: negative    Other   Erythema: absent  Scars: absent  Sensation: normal  Pulse: present  Swelling: none  Effusion: no effusion present      Left Knee Exam     Comments:  Crepitus with active squatting.             Procedures  "

## 2024-10-17 ENCOUNTER — HOSPITAL ENCOUNTER (OUTPATIENT)
Dept: NON INVASIVE DIAGNOSTICS | Facility: MEDICAL CENTER | Age: 74
Discharge: HOME | End: 2024-10-17
Payer: MEDICARE

## 2024-10-17 VITALS
BODY MASS INDEX: 25.92 KG/M2 | HEIGHT: 78 IN | WEIGHT: 224 LBS | SYSTOLIC BLOOD PRESSURE: 124 MMHG | HEART RATE: 78 BPM | DIASTOLIC BLOOD PRESSURE: 80 MMHG

## 2024-10-17 DIAGNOSIS — I71.21 ANEURYSM OF ASCENDING AORTA WITHOUT RUPTURE (HCC): Primary | ICD-10-CM

## 2024-10-17 DIAGNOSIS — I34.1 MVP (MITRAL VALVE PROLAPSE): ICD-10-CM

## 2024-10-17 DIAGNOSIS — I34.0 NONRHEUMATIC MITRAL VALVE REGURGITATION: ICD-10-CM

## 2024-10-17 LAB
AORTIC ARCH: 4.1 CM
AORTIC ROOT: 4.2 CM
AORTIC VALVE ANNULUS: 2.6 CM
AORTIC VALVE MEAN VELOCITY: 12 M/S
APICAL FOUR CHAMBER EJECTION FRACTION: 57 %
ASCENDING AORTA: 4.8 CM
AV MEAN GRADIENT: 7 MMHG
AV PEAK GRADIENT: 13 MMHG
AV REGURGITATION PRESSURE HALF TIME: 712 MS
BSA FOR ECHO PROCEDURE: 2.39 M2
DOP CALC AO PEAK VEL: 1.82 M/S
DOP CALC AO VTI: 39.68 CM
E WAVE DECELERATION TIME: 271 MS
E/A RATIO: 0.88
FRACTIONAL SHORTENING: 35 (ref 28–44)
INTERVENTRICULAR SEPTUM IN DIASTOLE (PARASTERNAL SHORT AXIS VIEW): 1.6 CM
INTERVENTRICULAR SEPTUM: 1.6 CM (ref 0.6–1.1)
LAAS-AP2: 26.7 CM2
LAAS-AP4: 18.4 CM2
LEFT ATRIUM SIZE: 3.8 CM
LEFT ATRIUM VOLUME (MOD BIPLANE): 77 ML
LEFT ATRIUM VOLUME INDEX (MOD BIPLANE): 32.2 ML/M2
LEFT INTERNAL DIMENSION IN SYSTOLE: 3.3 CM (ref 2.1–4)
LEFT VENTRICULAR INTERNAL DIMENSION IN DIASTOLE: 5.1 CM (ref 3.5–6)
LEFT VENTRICULAR POSTERIOR WALL IN END DIASTOLE: 1.3 CM
LEFT VENTRICULAR STROKE VOLUME: 82 ML
LVSV (TEICH): 82 ML
MV E'TISSUE VEL-LAT: 11 CM/S
MV E'TISSUE VEL-SEP: 9 CM/S
MV PEAK A VEL: 0.77 M/S
MV PEAK E VEL: 68 CM/S
MV STENOSIS PRESSURE HALF TIME: 79 MS
MV VALVE AREA P 1/2 METHOD: 2.78
RA PRESSURE ESTIMATED: 8 MMHG
RIGHT ATRIUM AREA SYSTOLE A4C: 22.1 CM2
RIGHT VENTRICLE ID DIMENSION: 4.5 CM
RV PSP: 37 MMHG
SINOTUBULAR JUNCTION: 3.5 CM
SL CV AV DECELERATION TIME RETROGRADE: 2456 MS
SL CV AV PEAK GRADIENT RETROGRADE: 50 MMHG
SL CV LEFT ATRIUM LENGTH A2C: 6.1 CM
SL CV LV EF: 55
SL CV PED ECHO LEFT VENTRICLE DIASTOLIC VOLUME (MOD BIPLANE) 2D: 125 ML
SL CV PED ECHO LEFT VENTRICLE SYSTOLIC VOLUME (MOD BIPLANE) 2D: 43 ML
SL CV SINUS OF VALSALVA 2D: 4.2 CM
STJ: 3.5 CM
TR MAX PG: 29 MMHG
TR PEAK VELOCITY: 2.7 M/S
TRICUSPID ANNULAR PLANE SYSTOLIC EXCURSION: 2.5 CM
TRICUSPID VALVE PEAK REGURGITATION VELOCITY: 2.7 M/S

## 2024-10-17 PROCEDURE — 93306 TTE W/DOPPLER COMPLETE: CPT

## 2024-10-17 PROCEDURE — 93306 TTE W/DOPPLER COMPLETE: CPT | Performed by: INTERNAL MEDICINE

## 2024-10-17 NOTE — RESULT ENCOUNTER NOTE
ECHO reviewed.  Pumping strength (ejection fraction) is normal.  Valves: Mitral valve prolapse with moderate backflow as was seen on the previous echo, no change  Aortic root is unchanged in size.  Ascending aorta now measures 4.8 cm, previously it measured 4.3 cm on the CT.  CT is more reliable for aortic measurement.  In view of the change of the dimension of the aorta on the echo I will recommend a CT of the chest to confirm the aortic size.  Please call with test results.

## 2024-10-23 DIAGNOSIS — I10 HYPERTENSION, UNSPECIFIED TYPE: ICD-10-CM

## 2024-10-23 DIAGNOSIS — E78.2 MIXED HYPERLIPIDEMIA: ICD-10-CM

## 2024-10-24 RX ORDER — ROSUVASTATIN CALCIUM 5 MG/1
5 TABLET, COATED ORAL 3 TIMES WEEKLY
Qty: 36 TABLET | Refills: 3 | Status: SHIPPED | OUTPATIENT
Start: 2024-10-25

## 2024-10-24 RX ORDER — AMLODIPINE AND BENAZEPRIL HYDROCHLORIDE 5; 20 MG/1; MG/1
1 CAPSULE ORAL DAILY
Qty: 90 CAPSULE | Refills: 0 | Status: SHIPPED | OUTPATIENT
Start: 2024-10-24

## 2024-10-28 ENCOUNTER — HOSPITAL ENCOUNTER (OUTPATIENT)
Dept: RADIOLOGY | Facility: MEDICAL CENTER | Age: 74
Discharge: HOME | End: 2024-10-28
Payer: MEDICARE

## 2024-10-28 DIAGNOSIS — I71.21 ANEURYSM OF ASCENDING AORTA WITHOUT RUPTURE (HCC): ICD-10-CM

## 2024-10-28 PROCEDURE — 71250 CT THORAX DX C-: CPT

## 2024-11-01 NOTE — RESULT ENCOUNTER NOTE
Chest CT reviewed.  Aortic size is minimally changed from the prior CT.  Aorta measures at 44 mm.  Previous measurement was 43 mm.  These findings are overall reassuring.  We will recheck a CT in 1 year.  Please call with results.

## 2024-11-25 ENCOUNTER — OFFICE VISIT (OUTPATIENT)
Dept: URGENT CARE | Facility: MEDICAL CENTER | Age: 74
End: 2024-11-25
Payer: MEDICARE

## 2024-11-25 VITALS
RESPIRATION RATE: 18 BRPM | WEIGHT: 218.3 LBS | BODY MASS INDEX: 25.26 KG/M2 | OXYGEN SATURATION: 95 % | HEART RATE: 72 BPM | TEMPERATURE: 97.6 F | DIASTOLIC BLOOD PRESSURE: 68 MMHG | HEIGHT: 78 IN | SYSTOLIC BLOOD PRESSURE: 138 MMHG

## 2024-11-25 DIAGNOSIS — I10 BENIGN ESSENTIAL HYPERTENSION: ICD-10-CM

## 2024-11-25 DIAGNOSIS — B02.9 ZOSTER WITHOUT COMPLICATIONS: Primary | ICD-10-CM

## 2024-11-25 PROCEDURE — 99213 OFFICE O/P EST LOW 20 MIN: CPT | Performed by: FAMILY MEDICINE

## 2024-11-25 PROCEDURE — G0463 HOSPITAL OUTPT CLINIC VISIT: HCPCS | Performed by: FAMILY MEDICINE

## 2024-11-25 RX ORDER — VALACYCLOVIR HYDROCHLORIDE 1 G/1
1000 TABLET, FILM COATED ORAL 3 TIMES DAILY
Qty: 30 TABLET | Refills: 0 | Status: SHIPPED | OUTPATIENT
Start: 2024-11-25 | End: 2024-12-05

## 2024-11-25 NOTE — PROGRESS NOTES
[facility]        NAME: El Smith is a 74 y.o. male  : 1950    MRN: [id number]  DATE: 2024  TIME: 12:17 PM    Assessment and Plan   Zoster without complications [B02.9]  1. Zoster without complications  valACYclovir (VALTREX) 1,000 mg tablet        Zoster on left side/flank  Valacyclovir  Follow up with PCP    Patient Instructions       Follow up with PCP in 3-5 days.  Proceed to  ER if symptoms worsen.    If tests have been performed at [facility], our office will contact you with results if changes need to be made to the care plan discussed with you at the visit.  [facility]    Chief Complaint     Chief Complaint   Patient presents with    Rash     Pt reports of a rash possible shingles first noticed about 2 days ago , no spreading since initial discovery.pt is taking immunosuppresants          History of Present Illness       Rash  This is a new problem. The current episode started yesterday. The problem has been gradually worsening since onset. The affected locations include the torso and back. The rash is characterized by pain and redness. He was exposed to nothing. Associated symptoms include joint pain. Pertinent negatives include no anorexia, congestion, cough, decreased physical activity, decreased responsiveness, decreased sleep, drinking less, diarrhea, facial edema, fatigue, fever, itching, rhinorrhea, shortness of breath, sore throat or vomiting.       Review of Systems   Review of Systems   Constitutional:  Negative for decreased responsiveness, fatigue and fever.   HENT:  Negative for congestion, rhinorrhea and sore throat.    Respiratory:  Negative for cough and shortness of breath.    Gastrointestinal:  Negative for anorexia, diarrhea and vomiting.   Musculoskeletal:  Positive for joint pain.   Skin:  Positive for rash. Negative for itching.         Current Medications       Current Outpatient Medications:      amLODIPine-benazepril (LOTREL 5-20) 5-20 MG per capsule, TAKE 1 CAPSULE DAILY, Disp: 90 capsule, Rfl: 0    Coenzyme Q10 (CoQ-10) 100 MG CAPS, , Disp: , Rfl:     hydrochlorothiazide (MICROZIDE) 12.5 mg capsule, Take 1 capsule (12.5 mg total) by mouth daily, Disp: 90 capsule, Rfl: 3    Klor-Con M15 15 MEQ TBCR, , Disp: , Rfl:     methotrexate 2.5 MG tablet, , Disp: , Rfl:     metoprolol succinate (TOPROL-XL) 25 mg 24 hr tablet, TAKE 1 TABLET DAILY, Disp: 90 tablet, Rfl: 1    multivitamin (THERAGRAN) TABS, Take 1 tablet by mouth daily, Disp: , Rfl:     valACYclovir (VALTREX) 1,000 mg tablet, Take 1 tablet (1,000 mg total) by mouth 3 (three) times a day for 10 days, Disp: 30 tablet, Rfl: 0    Barberry-Oreg Grape-Goldenseal (Berberine Complex) 200-200-50 MG CAPS, , Disp: , Rfl:     Folic Acid 0.8 MG CAPS, , Disp: , Rfl:     OMEGA-3 FATTY ACIDS PO, Take 1 capsule by mouth daily , Disp: , Rfl:     rosuvastatin (CRESTOR) 5 mg tablet, TAKE 1 TABLET THREE TIMES  WEEKLY, Disp: 36 tablet, Rfl: 3    Current Allergies     Allergies as of 11/25/2024 - Reviewed 11/25/2024   Allergen Reaction Noted    Biaxin [clarithromycin] Other (See Comments) 09/09/2023    Sulfa antibiotics Other (See Comments) 09/09/2023            The following portions of the patient's history were reviewed and updated as appropriate: allergies, current medications, past family history, past medical history, past social history, past surgical history and problem list.     Past Medical History:   Diagnosis Date    AAA (abdominal aortic aneurysm) (HCC)     Arthritis     wrist and knees    Hypertension     Mixed hyperlipidemia     Nontoxic single thyroid nodule        Past Surgical History:   Procedure Laterality Date    COLONOSCOPY      KNEE ARTHROSCOPY      therapeutic    VASECTOMY         Family History   Problem Relation Age of Onset    Heart attack Mother         MI    Sudden death Mother         SCD    Stroke Father         CVA    Heart attack Family         " acute MI    Aneurysm Family         AAA    Cancer Family         gastric    Colon cancer Family     Liver cancer Family     Lung cancer Family     Prostate cancer Family     Stroke Family         syndrome         Medications have been verified.        Objective   /68   Pulse 72   Temp 97.6 °F (36.4 °C) (Temporal)   Resp 18   Ht 6' 7\" (2.007 m)   Wt 99 kg (218 lb 4.8 oz)   SpO2 95%   BMI 24.59 kg/m²   No LMP for male patient.       Physical Exam     Physical Exam  Vitals reviewed.   Constitutional:       Appearance: Normal appearance.   HENT:      Head: Normocephalic and atraumatic.   Cardiovascular:      Rate and Rhythm: Normal rate and regular rhythm.   Skin:     General: Skin is warm and dry.      Capillary Refill: Capillary refill takes less than 2 seconds.      Findings: Rash present.      Comments: Cluster of vesicles on an erythematous base on left side/flank    Neurological:      Mental Status: He is alert.                   Answers submitted by the patient for this visit:  Rash Questionnaire (Submitted on 11/24/2024)  Chief Complaint: Rash    "

## 2024-11-27 DIAGNOSIS — I10 BENIGN ESSENTIAL HYPERTENSION: Primary | ICD-10-CM

## 2024-11-27 RX ORDER — HYDROCHLOROTHIAZIDE 12.5 MG/1
12.5 CAPSULE ORAL DAILY
Qty: 90 CAPSULE | Refills: 0 | Status: SHIPPED | OUTPATIENT
Start: 2024-11-27

## 2025-01-27 DIAGNOSIS — I10 HYPERTENSION, UNSPECIFIED TYPE: ICD-10-CM

## 2025-01-27 NOTE — TELEPHONE ENCOUNTER
Reason for call:   [x] Refill   [] Prior Auth  [] Other:     Office:   [] PCP/Provider -   [x] Specialty/Provider - Cardio    Medication:  amLODIPine-benazepril (LOTREL 5-20) 5-20 MG per capsule    Dose/Frequency: TAKE 1 CAPSULE DAILY,     Quantity: 90    Pharmacy: [facility] - [place], [place] - [address] [place] [address]      Does the patient have enough for 3 days?   [x] Yes   [] No - Send as HP to POD

## 2025-01-28 RX ORDER — AMLODIPINE AND BENAZEPRIL HYDROCHLORIDE 5; 20 MG/1; MG/1
1 CAPSULE ORAL DAILY
Qty: 90 CAPSULE | Refills: 0 | Status: SHIPPED | OUTPATIENT
Start: 2025-01-28

## 2025-02-05 PROBLEM — M35.3 PMR (POLYMYALGIA RHEUMATICA) (HCC): Status: ACTIVE | Noted: 2025-02-05

## 2025-02-05 PROBLEM — M15.0 PRIMARY GENERALIZED (OSTEO)ARTHRITIS: Status: ACTIVE | Noted: 2025-02-05

## 2025-02-05 PROBLEM — Z79.631 METHOTREXATE, LONG TERM, CURRENT USE: Status: ACTIVE | Noted: 2025-02-05

## 2025-02-06 ENCOUNTER — TELEPHONE (OUTPATIENT)
Dept: RHEUMATOLOGY | Facility: CLINIC | Age: 75
End: 2025-02-06

## 2025-02-06 ENCOUNTER — TELEMEDICINE (OUTPATIENT)
Dept: RHEUMATOLOGY | Facility: CLINIC | Age: 75
End: 2025-02-06

## 2025-02-06 DIAGNOSIS — M15.0 PRIMARY GENERALIZED (OSTEO)ARTHRITIS: ICD-10-CM

## 2025-02-06 DIAGNOSIS — Z79.631 METHOTREXATE, LONG TERM, CURRENT USE: ICD-10-CM

## 2025-02-06 DIAGNOSIS — M35.3 PMR (POLYMYALGIA RHEUMATICA) (HCC): Primary | ICD-10-CM

## 2025-02-06 NOTE — ASSESSMENT & PLAN NOTE
Mr. Smith is a 74-year-old male with history significant for polymyalgia rheumatica with onset of symptoms in May 2023 who presents for a follow-up.  He is currently on methotrexate 10 mg once weekly.  He is transferring care to [facility] from [facility].     # Polymyalgia rheumatica  - Gera presents today for a follow-up of polymyalgia rheumatica for which he is currently on oral methotrexate 10 mg once weekly.  In the interim he was seen by [facility] and mentions approximately 1 year ago he was tapered off steroids and started on methotrexate 20 mg once weekly which he has gradually tapered down to his current dose.  He is denying any concerning articular complaints and will plan to decrease the methotrexate by 1 tablet every 2 weeks.  After he completes this I will update his inflammation markers and high risk medication lab monitoring to assess for drug toxicities.    - He is not reporting symptoms that would be concerning for temporal arteritis.    Orders:    C-reactive protein; Future    Sedimentation rate, automated; Future

## 2025-02-06 NOTE — PROGRESS NOTES
Virtual Regular Visit  Name: El Smith      : 1950      MRN: [id number]  Encounter Provider: Kayley Weldon MD  Encounter Date: 2025   Encounter department: [facility] [place]      Verification of patient location:  Patient is located at {[facility] Virtual Patient Location:[address]449} in the following state in which I hold an active license {[facility] virtual patient location:[address]28} :Assessment & Plan  PMR (polymyalgia rheumatica) (HCC)    Orders:  •  C-reactive protein; Future  •  Sedimentation rate, automated; Future    Methotrexate, long term, current use    Orders:  •  CBC and differential; Future  •  Comprehensive metabolic panel; Future    Primary generalized (osteo)arthritis               Encounter provider Kayley Weldon MD    The patient was identified by name and date of birth. El Smith was informed that this is a telemedicine visit and that the visit is being conducted through {[facility] VIRTUAL VISIT MEDIUM:93369}.  {Telemedicine confidentiality :26792} {Telemedicine participants:73669}  He acknowledged consent and understanding of privacy and security of the video platform. The patient has agreed to participate and understands they can discontinue the visit at any time.    Patient is aware this is a billable service.     History of Present Illness {?Quick Links Encounters * My Last Note * Last Note in Specialty * Snapshot * Since Last Visit * History :01223}    HPI  Review of Systems    Objective {?Quick Links Trend Vitals * Enter New Vitals * Results Review * Timeline (Adult) * Labs * Imaging * Cardiology * Procedures * Lung Cancer Screening * Surgical eConsent :16400}  There were no vitals taken for this visit.    Physical Exam    Visit Time  Total Visit Duration: ***

## 2025-02-06 NOTE — PROGRESS NOTES
Virtual Regular Visit  Name: El Smith      : 1950      MRN: [id number]  Encounter Provider: Kayley Weldon MD  Encounter Date: 2025   Encounter department: [facility] [place]      Verification of patient location:  Patient is located at Home in the following state in which I hold an active license [place] :  Assessment & Plan  PMR (polymyalgia rheumatica) ([facility])  Mr. Smith is a 74-year-old male with history significant for polymyalgia rheumatica with onset of symptoms in May 2023 who presents for a follow-up.  He is currently on methotrexate 10 mg once weekly.  He is transferring care to [facility] from [facility].     # Polymyalgia rheumatica  - Gera presents today for a follow-up of polymyalgia rheumatica for which he is currently on oral methotrexate 10 mg once weekly.  In the interim he was seen by [facility] and mentions approximately 1 year ago he was tapered off steroids and started on methotrexate 20 mg once weekly which he has gradually tapered down to his current dose.  He is denying any concerning articular complaints and will plan to decrease the methotrexate by 1 tablet every 2 weeks.  After he completes this I will update his inflammation markers and high risk medication lab monitoring to assess for drug toxicities.    - He is not reporting symptoms that would be concerning for temporal arteritis.    Orders:    C-reactive protein; Future    Sedimentation rate, automated; Future    Methotrexate, long term, current use    Orders:    CBC and differential; Future    Comprehensive metabolic panel; Future    Primary generalized (osteo)arthritis         PMR (polymyalgia rheumatica) (HCC)         Methotrexate, long term, current use         Primary generalized (osteo)arthritis           Patient's rheumatologic disease(s) threaten long-term function if not appropriately managed.      Encounter provider Kayley Weldon MD    The patient was identified by name and date of birth.  El Smith was informed that this is a telemedicine visit and that the visit is being conducted through the Epic Embedded platform. He agrees to proceed..  My office door was closed. No one else was in the room.  He acknowledged consent and understanding of privacy and security of the video platform. The patient has agreed to participate and understands they can discontinue the visit at any time.    Patient is aware this is a billable service.     History of Present Illness     HPI    INITIAL VISIT NOTE (7/2023):  Mr. Smith is a 73-year-old male with no significant past medical history who presents for an evaluation of polymyalgia rheumatica/joint pains and stiffness.  He is currently on prednisone 15 mg once daily prescribed by his primary care physician.  He is self-referred today.     Patient reports he was in his usual state of health up until the end of April/early May 2023 at which time he was working in the woods and thought he may have had a tick bite.  He did not experience any immediate symptoms or notice a skin rash but reports shortly afterwards he started to experience an aching sensation in his right leg associated with swelling mostly around the knee.  He was evaluated by his primary care physician and at separate intervals was tested for Lyme disease which were both negative.  Initial labs showed a sed rate of 22 with a C-reactive protein of 21.3.  There was a consideration for polymyalgia rheumatica so he was started on prednisone on 5/25/23 at 20 mg once daily for 2 weeks and then 10 mg once daily for 2 weeks.  He reports within 2 days of taking the prednisone 20 mg once daily he had complete resolution of his symptoms and did well throughout the remaining of the 4-week course.  He mentions after completing the steroid course he started to experience a recurrence of symptoms with pain and stiffness affecting his neck, shoulders, bicep region, hands and back of his thighs.  He did not notice  any swollen joints.  Morning stiffness was taking at least 2 hours to improve.  He was again seen by his primary care physician and had labs done which showed an elevated ESR of 60 and C-reactive protein of 53.3.  He was restarted on prednisone at 10 mg once daily for 2 weeks starting on July 4 but he mentions this dose did not help him as much.  The prednisone was then increased to 15 mg once daily which he is still on and reports while it has helped about 70% he is still experiencing some joint pains and stiffness.  He had labs checked on July 24 which showed a downtrending ESR of 28 and C-reactive protein of 9.  He mentions in the past 3 months he has noticed some weight loss of approximately 8 pounds.  He feels his appetite has also decreased.  No fevers, headaches, scalp tenderness, vision changes or jaw claudication.  No history of inflammatory eye disease, psoriasis, inflammatory bowel disease or family history of rheumatic disease.  His brother passed away as a result of primary sclerosing cholangitis.        8/28/2023:  Patient presents for a follow-up of polymyalgia rheumatica.  He is currently on prednisone 15 mg once daily.  I reviewed his recent labs which showed normal inflammatory markers.  An SPEP showed a faint band visible with overall polyclonal pattern in the gamma region possibly representing an inflammatory or acute phase response, but a developing plasma cell disorder cannot be excluded.  A uric acid level, CK, Sjogren's antibodies and TSH were unremarkable.  X-rays of the bilateral wrists showed advanced degenerative changes.     At the last visit we discussed restarting prednisone at 17.5 mg once daily which he was on for about a month and then decreased the prednisone to 15 mg once daily yesterday.  He mentions he has been doing well on this except for noticing some slight stiffness affecting his fingers.  Other than this no concerning joint pains, swelling or stiffness.     He mentions he  does have a wound on his leg and states that healing has been slow because of the steroid use.  It does seem to be improving slowly.      2/6/2025:  Patient presents for a follow-up of polymyalgia rheumatica.  He is currently on methotrexate 4 tablets once weekly.  I reviewed his recent labs which shows an unremarkable CBC, CMP, ESR and CRP.  In the interim he was seen by [facility] and presents to re-establish with  kes.    He mentions he was tapered off the prednisone approximately 1 year ago and started on methotrexate at 20 mg once weekly for a steroid sparing effect.  He has done very well on the methotrexate and is denying any concerning joint pains, swelling or stiffness.  He has been gradually tapering down on the methotrexate and states from 8 tablets once weekly he tapered down to 4 tablets once weekly and will consider decreasing by 1 tablet every 2 weeks.    He has been tolerating the methotrexate well without any concerns for side effects or recurrent infections.      Review of Systems  Constitutional: Negative for weight change, fevers, chills, night sweats, fatigue.  ENT/Mouth: Negative for hearing changes, ear pain, nasal congestion, sinus pain, hoarseness, sore throat, rhinorrhea, swallowing difficulty.   Eyes: Negative for pain, redness, discharge, vision changes.   Cardiovascular: Negative for chest pain, SOB, palpitations.   Respiratory: Negative for cough, sputum, wheezing, dyspnea.   Gastrointestinal: Negative for nausea, vomiting, diarrhea, constipation, pain, heartburn.  Genitourinary: Negative for dysuria, urinary frequency, hematuria.   Musculoskeletal: As per HPI.  Skin: Negative for skin rash, color changes.   Neuro: Negative for weakness, numbness, tingling, loss of consciousness.   Psych: Negative for anxiety, depression.   Heme/Lymph: Negative for easy bruising, bleeding, lymphadenopathy.      Objective   There were no vitals taken for this visit.    Physical Exam  General: Well  appearing, well nourished, in no distress. Oriented x 3, normal mood and affect.  Ambulating without difficulty.  Skin: Good turgor, no rash, unusual bruising or prominent lesions.  Hair: Normal texture and distribution.  HEENT:  Head: Normocephalic, atraumatic.  Eyes: Conjunctiva clear, sclera non-icteric, EOM intact.  Nose: No external lesions.  Neck: Supple.  Neurologic: Alert and oriented. No focal neurological deficits appreciated.   Psychiatric: Normal mood and affect.     Visit Time  Total Visit Duration: 12 minutes.

## 2025-02-12 DIAGNOSIS — I10 HYPERTENSION, UNSPECIFIED TYPE: ICD-10-CM

## 2025-02-12 RX ORDER — AMLODIPINE AND BENAZEPRIL HYDROCHLORIDE 5; 20 MG/1; MG/1
1 CAPSULE ORAL DAILY
Qty: 15 CAPSULE | Refills: 0 | Status: SHIPPED | OUTPATIENT
Start: 2025-02-12

## 2025-02-12 NOTE — TELEPHONE ENCOUNTER
Patient called requesting refill for amLODIPine-benazepril (LOTREL 5-20) 5-20 MG . Patient made aware medication was refilled on 01/28/25 for 90 with [facility]. Patient instructed to contact the pharmacy to obtain refills of medication. Patient verbalized understanding.

## 2025-02-12 NOTE — TELEPHONE ENCOUNTER
Reason for call:   [x] Refill   [] Prior Auth  [] Other:     Office:   [x] PCP/Provider -   [] Specialty/Provider -     This is not a duplicate, patient is waiting for his home delivery from [facility]    Medication: Amlodipine    Dose/Frequency: 5-20 mg    Quantity: 15    Pharmacy: [address] [place]    Does the patient have enough for 3 days?   [] Yes   [x] No - Send as HP to POD

## 2025-03-10 DIAGNOSIS — I10 BENIGN ESSENTIAL HYPERTENSION: ICD-10-CM

## 2025-03-10 RX ORDER — HYDROCHLOROTHIAZIDE 12.5 MG/1
12.5 CAPSULE ORAL DAILY
Qty: 90 CAPSULE | Refills: 0 | Status: SHIPPED | OUTPATIENT
Start: 2025-03-10

## 2025-03-10 NOTE — TELEPHONE ENCOUNTER
Reason for call:   [x] Refill   [] Prior Auth  [] Other:     Office:   [] PCP/Provider -   [x] Specialty/Provider - Gianni Chaudhary MD / [facility]     Medication: hydroCHLOROthiazide 12.5 mg capsule / TAKE 1 CAPSULE DAILY    Pharmacy: [facility] - [address]     Mail Away Pharmacy   Does the patient have enough for 10 days?   [x] Yes   [] No - Send as HP to POD

## 2025-03-11 DIAGNOSIS — I10 HYPERTENSION, UNSPECIFIED TYPE: ICD-10-CM

## 2025-03-11 RX ORDER — METOPROLOL SUCCINATE 25 MG/1
25 TABLET, EXTENDED RELEASE ORAL DAILY
Qty: 90 TABLET | Refills: 1 | Status: SHIPPED | OUTPATIENT
Start: 2025-03-11 | End: 2025-03-13 | Stop reason: SDUPTHER

## 2025-03-11 NOTE — TELEPHONE ENCOUNTER
Patient called wondering about his Metoprolol script. He states he got a messaged sating it was approved, but not sent to the pharmacy. I explained it was sent to the pharmacy, but sometimes the message that gets sent with the approval is confusing. I told him it was phoned in to [facility].

## 2025-03-13 DIAGNOSIS — I10 HYPERTENSION, UNSPECIFIED TYPE: ICD-10-CM

## 2025-03-13 RX ORDER — METOPROLOL SUCCINATE 25 MG/1
25 TABLET, EXTENDED RELEASE ORAL DAILY
Qty: 90 TABLET | Refills: 0 | Status: SHIPPED | OUTPATIENT
Start: 2025-03-13

## 2025-03-13 NOTE — TELEPHONE ENCOUNTER
Reason for call:   [x] Refill   [] Prior Auth  [x] Other: Pharmacy told pt they never received     Office:   [] PCP/Provider -   [x] Specialty/Provider - [facility]     Medication: metoprolol succinate (TOPROL-XL) 25 mg 24 hr tablet Take 1 tablet (25 mg total) by mouth daily       [facility]     Local Pharmacy   Does the patient have enough for 3 days?   [] Yes   [] No - Send as HP to POD    Mail Away Pharmacy   Does the patient have enough for 10 days?   [x] Yes   [] No - Send as HP to POD

## 2025-03-21 DIAGNOSIS — E78.2 MIXED HYPERLIPIDEMIA: ICD-10-CM

## 2025-03-21 RX ORDER — ROSUVASTATIN CALCIUM 5 MG/1
5 TABLET, COATED ORAL 3 TIMES WEEKLY
Qty: 36 TABLET | Refills: 0 | Status: SHIPPED | OUTPATIENT
Start: 2025-03-21

## 2025-03-21 NOTE — TELEPHONE ENCOUNTER
Medication: Rosuvastatin 5mg tablet    Dose/Frequency: 1 tablet 3 times per week     Quantity: 36    Pharmacy: [facility]     Office:   [] PCP/Provider -   [x] Speciality/Provider -     Does the patient have enough for 3 days?   [x] Yes   [] No - Send as HP to POD

## 2025-05-09 DIAGNOSIS — I10 HYPERTENSION, UNSPECIFIED TYPE: ICD-10-CM

## 2025-05-09 RX ORDER — AMLODIPINE AND BENAZEPRIL HYDROCHLORIDE 5; 20 MG/1; MG/1
1 CAPSULE ORAL DAILY
Qty: 30 CAPSULE | Refills: 0 | Status: SHIPPED | OUTPATIENT
Start: 2025-05-09

## 2025-05-09 NOTE — TELEPHONE ENCOUNTER
Reason for call:   [x] Refill   [] Prior Auth  [] Other:     Office:   [] PCP/Provider -   [x] Specialty/Provider -  Gianni Chaudhary MD    Medication: amLODIPine-benazepril (LOTREL 5-20) 5-20 MG per capsule    Dose/Frequency: Take 1 capsule by mouth daily    Quantity: 90    Pharmacy: [facility] - [address] [facility]   Does the patient have enough for 3 days?   [] Yes   [] No - Send as HP to POD    Mail Away Pharmacy   Does the patient have enough for 10 days?   [x] Yes   [] No - Send as HP to POD

## 2025-05-16 DIAGNOSIS — I10 BENIGN ESSENTIAL HYPERTENSION: ICD-10-CM

## 2025-05-16 RX ORDER — HYDROCHLOROTHIAZIDE 12.5 MG/1
12.5 CAPSULE ORAL DAILY
Qty: 90 CAPSULE | Refills: 0 | Status: SHIPPED | OUTPATIENT
Start: 2025-05-16

## 2025-05-28 DIAGNOSIS — I10 HYPERTENSION, UNSPECIFIED TYPE: ICD-10-CM

## 2025-05-29 ENCOUNTER — CONSULT (OUTPATIENT)
Age: 75
End: 2025-05-29
Payer: MEDICARE

## 2025-05-29 ENCOUNTER — TELEPHONE (OUTPATIENT)
Dept: CARDIOLOGY CLINIC | Facility: MEDICAL CENTER | Age: 75
End: 2025-05-29

## 2025-05-29 VITALS
WEIGHT: 217.4 LBS | HEIGHT: 78 IN | DIASTOLIC BLOOD PRESSURE: 71 MMHG | BODY MASS INDEX: 25.15 KG/M2 | SYSTOLIC BLOOD PRESSURE: 164 MMHG | HEART RATE: 61 BPM

## 2025-05-29 DIAGNOSIS — R10.84 GENERALIZED ABDOMINAL PAIN: Primary | ICD-10-CM

## 2025-05-29 DIAGNOSIS — R19.4 CHANGE IN BOWEL HABITS: ICD-10-CM

## 2025-05-29 DIAGNOSIS — I10 HYPERTENSION, UNSPECIFIED TYPE: ICD-10-CM

## 2025-05-29 PROCEDURE — G2211 COMPLEX E/M VISIT ADD ON: HCPCS | Performed by: NURSE PRACTITIONER

## 2025-05-29 PROCEDURE — 99204 OFFICE O/P NEW MOD 45 MIN: CPT | Performed by: NURSE PRACTITIONER

## 2025-05-29 RX ORDER — DICYCLOMINE HCL 20 MG
20 TABLET ORAL EVERY 12 HOURS PRN
Qty: 30 TABLET | Refills: 1 | Status: SHIPPED | OUTPATIENT
Start: 2025-05-29 | End: 2025-06-06

## 2025-05-29 RX ORDER — SODIUM CHLORIDE, SODIUM LACTATE, POTASSIUM CHLORIDE, CALCIUM CHLORIDE 600; 310; 30; 20 MG/100ML; MG/100ML; MG/100ML; MG/100ML
125 INJECTION, SOLUTION INTRAVENOUS CONTINUOUS
OUTPATIENT
Start: 2025-05-29

## 2025-05-29 RX ORDER — AMLODIPINE AND BENAZEPRIL HYDROCHLORIDE 5; 20 MG/1; MG/1
1 CAPSULE ORAL DAILY
Qty: 90 CAPSULE | Refills: 3 | Status: SHIPPED | OUTPATIENT
Start: 2025-05-29

## 2025-05-29 RX ORDER — AMLODIPINE AND BENAZEPRIL HYDROCHLORIDE 5; 20 MG/1; MG/1
1 CAPSULE ORAL DAILY
Qty: 30 CAPSULE | Refills: 11 | OUTPATIENT
Start: 2025-05-29

## 2025-05-29 NOTE — PROGRESS NOTES
Name: El Smith      : 1950      MRN: [id number]  Encounter Provider: ZEKE Lira  Encounter Date: 2025   Encounter department: [facility]  :  Assessment & Plan  Generalized abdominal pain  Patient reports intermittent episodes of general abdominal pain and change in bowel habits.  No abdominal tenderness noted on examination.  Patient denies nausea, vomiting, acid reflux, or heartburn.  Abdominal pain may be secondary to constipation and altered bowel function.  But need to rule out lesion as contributing factor.  Orders:    Colonoscopy; Future    dicyclomine (BENTYL) 20 mg tablet; Take 1 tablet (20 mg total) by mouth every 12 (twelve) hours as needed (abdomen pain/spasms)  If Colonoscopy unrevealing of cause of abdominal pain recommend CT of abdomen and pelvis for further evaluation  Change in bowel habits  Patient reports change in bowel habit, he will alternate between normal bowel patterns constipation and diarrhea.  Patient reports he is currently taking fiber supplement 1 g daily.  Further evaluation of change in bowel habits will be done with colonoscopy.  Patient does have history of colon polyps will need to rule out lesion as contributing factor but change in bowel pattern may be functional.  Orders:    Colonoscopy; Future    polyethylene glycol (GOLYTELY) 4000 mL solution; Take 4,000 mL by mouth once for 1 dose Take as directed by office prior to procedure  Increase fiber supplement to 3 tablets daily  High-fiber diet  Follow-up after procedure    History of Present Illness   HPI  El Smith is a 75 y.o. male with past medical history of abdominal aortic aneurysm, CAD, osteoarthritis, MVP, PMR previously on methotrexate which was discontinued in March, who presents to office as consult.  Patient reports generalized abdominal pain and change in bowel habits for approximately 1 year.  But that initially this may have been due to  his methotrexate but that was discontinued in March and he continues to have symptoms.  Patient reports that abdominal pain is intermittent and his bowel pattern will vary from normal, constipation, or diarrhea.  Patient denies blood in stool or black tarry stool.  Patient denies nausea, vomiting, acid reflux, or heartburn.  Abdomen exam benign no abdominal tenderness or guarding.  Reviewed patient's recent lab work done 4/2025 which showed CBC and CMP within normal limits.    Colonoscopy done 12/16/2021 which showed redundant colon.  Colon polyp removed internal hemorrhoids and melanosis coli.  Biopsy showed tubular adenoma.  Large intestine biopsy showed polypoid colonic mucosa with melanosis coli and focal adenomatoid changes.  Negative for high-grade dysplasia.  Biopsy of right colon showed polypoid colonic mucosa with lymphoid aggregate and melanosis coli.  Negative for dysplasia      Review of Systems   Constitutional:  Negative for chills and fever.   HENT:  Negative for ear pain and sore throat.    Eyes:  Negative for pain and visual disturbance.   Respiratory:  Negative for cough and shortness of breath.    Cardiovascular:  Negative for chest pain and palpitations.   Gastrointestinal:  Positive for abdominal pain, constipation and diarrhea. Negative for vomiting.   Genitourinary:  Negative for dysuria and hematuria.   Musculoskeletal:  Negative for arthralgias and back pain.   Skin:  Negative for color change and rash.   Neurological:  Negative for seizures and syncope.   All other systems reviewed and are negative.    Medical History Reviewed by provider this encounter:  Tobacco  Allergies  Meds  Problems  Med Hx  Surg Hx  Fam Hx     .  Past Medical History   Past Medical History[1]  Past Surgical History[2]  Family History[3]   reports that he quit smoking about 30 years ago. His smoking use included cigarettes. He started smoking about 55 years ago. He has a 12.5 pack-year smoking history. He has  "quit using smokeless tobacco. He reports that he does not currently use alcohol. He reports that he does not use drugs.  Current Outpatient Medications   Medication Instructions    amLODIPine-benazepril (LOTREL 5-20) 5-20 MG per capsule 1 capsule, Oral, Daily    Coenzyme Q10 (CoQ-10) 100 MG CAPS     dicyclomine (BENTYL) 20 mg, Oral, Every 12 hours PRN    Folic Acid 0.8 MG CAPS     hydroCHLOROthiazide 12.5 mg, Oral, Daily    Klor-Con M15 15 MEQ TBCR     methotrexate 2.5 MG tablet     metoprolol succinate (TOPROL-XL) 25 mg, Oral, Daily    multivitamin (THERAGRAN) TABS 1 tablet, Daily    OMEGA-3 FATTY ACIDS PO 1 capsule, Daily    polyethylene glycol (GOLYTELY) 4000 mL solution 4,000 mL, Oral, Once, Take as directed by office prior to procedure    rosuvastatin (CRESTOR) 5 mg, Oral, 3 times weekly   Allergies[4]   Medications Ordered Prior to Encounter[5]   Social History[6]     Objective   /71 (Patient Position: Sitting, Cuff Size: Standard)   Pulse 61   Ht 6' 7\" (2.007 m)   Wt 98.6 kg (217 lb 6.4 oz)   BMI 24.49 kg/m²      Physical Exam  Vitals and nursing note reviewed.   Constitutional:       General: He is not in acute distress.     Appearance: He is well-developed.   HENT:      Head: Normocephalic and atraumatic.     Eyes:      Conjunctiva/sclera: Conjunctivae normal.       Cardiovascular:      Rate and Rhythm: Normal rate and regular rhythm.      Pulses: Normal pulses.      Heart sounds: Normal heart sounds. No murmur heard.  Pulmonary:      Effort: Pulmonary effort is normal. No respiratory distress.      Breath sounds: Normal breath sounds. No stridor. No wheezing, rhonchi or rales.   Abdominal:      General: Bowel sounds are normal. There is no distension.      Palpations: Abdomen is soft. There is no mass.      Tenderness: There is no abdominal tenderness. There is no guarding or rebound.     Musculoskeletal:         General: No swelling.      Cervical back: Neck supple.      Right lower leg: No " edema.      Left lower leg: No edema.     Skin:     General: Skin is warm and dry.      Capillary Refill: Capillary refill takes less than 2 seconds.      Coloration: Skin is not jaundiced or pale.     Neurological:      Mental Status: He is alert and oriented to person, place, and time.     Psychiatric:         Mood and Affect: Mood normal.                [1]   Past Medical History:  Diagnosis Date    AAA (abdominal aortic aneurysm) (HCC)     Arthritis     wrist and knees    Hypertension     Mixed hyperlipidemia     Nontoxic single thyroid nodule    [2]   Past Surgical History:  Procedure Laterality Date    COLONOSCOPY      KNEE ARTHROSCOPY      therapeutic    VASECTOMY     [3]   Family History  Problem Relation Name Age of Onset    Heart attack Mother          MI    Sudden death Mother          SCD    Stroke Father          CVA    Heart attack Family          acute MI    Aneurysm Family          AAA    Cancer Family          gastric    Colon cancer Family      Liver cancer Family      Lung cancer Family      Prostate cancer Family      Stroke Family          syndrome   [4]   Allergies  Allergen Reactions    Biaxin [Clarithromycin] Other (See Comments)     Gi intolerance     Sulfa Antibiotics Other (See Comments)     GI intolerance      [5]   Current Outpatient Medications on File Prior to Visit   Medication Sig Dispense Refill    amLODIPine-benazepril (LOTREL 5-20) 5-20 MG per capsule Take 1 capsule by mouth daily 90 capsule 3    Coenzyme Q10 (CoQ-10) 100 MG CAPS       hydroCHLOROthiazide 12.5 mg capsule TAKE 1 CAPSULE DAILY 90 capsule 0    Klor-Con M15 15 MEQ TBCR       metoprolol succinate (TOPROL-XL) 25 mg 24 hr tablet Take 1 tablet (25 mg total) by mouth daily 90 tablet 0    multivitamin (THERAGRAN) TABS Take 1 tablet by mouth in the morning.      OMEGA-3 FATTY ACIDS PO Take 1 capsule by mouth in the morning.      rosuvastatin (CRESTOR) 5 mg tablet Take 1 tablet (5 mg total) by mouth 3 (three) times a week 36  tablet 0    Folic Acid 0.8 MG CAPS  (Patient not taking: Reported on 2025)      methotrexate 2.5 MG tablet  (Patient not taking: Reported on 2025)       No current facility-administered medications on file prior to visit.   [6]   Social History  Tobacco Use    Smoking status: Former     Current packs/day: 0.00     Average packs/day: 0.5 packs/day for 25.0 years (12.5 ttl pk-yrs)     Types: Cigarettes     Start date:      Quit date:      Years since quittin.4    Smokeless tobacco: Former   Vaping Use    Vaping status: Never Used   Substance and Sexual Activity    Alcohol use: Not Currently     Comment: social    Drug use: No

## 2025-05-29 NOTE — TELEPHONE ENCOUNTER
Patient called requesting refill for amLODIPine-benazepril (LOTREL 5-20) 5-20 MG per capsule . Patient made aware medication was refilled on 5/29/25 for 90 with 0 refills to [facility]. Patient instructed to contact the pharmacy and speak with someone directly to obtain refills of medication. Patient advised to call back for refill if their pharmacy is unable to assist them. Patient verbalized understanding. ,

## 2025-05-29 NOTE — PATIENT INSTRUCTIONS
Scheduled date of colonoscopy (as of today):08/05/25  Physician performing colonoscopy:Dr. Huang  Location of colonoscopy:  Bowel prep reviewed with patient:Vasquez  Instructions reviewed with patient by:bc  Clearances:n/a   Continue fiber supplement increase to 3 pills a day  High-fiber diet

## 2025-06-02 ENCOUNTER — OFFICE VISIT (OUTPATIENT)
Dept: RHEUMATOLOGY | Facility: CLINIC | Age: 75
End: 2025-06-02
Payer: MEDICARE

## 2025-06-02 VITALS
DIASTOLIC BLOOD PRESSURE: 70 MMHG | WEIGHT: 218.8 LBS | BODY MASS INDEX: 25.32 KG/M2 | HEIGHT: 78 IN | OXYGEN SATURATION: 97 % | SYSTOLIC BLOOD PRESSURE: 120 MMHG | HEART RATE: 62 BPM

## 2025-06-02 DIAGNOSIS — M35.3 PMR (POLYMYALGIA RHEUMATICA) (HCC): Primary | ICD-10-CM

## 2025-06-02 DIAGNOSIS — M15.0 PRIMARY GENERALIZED (OSTEO)ARTHRITIS: ICD-10-CM

## 2025-06-02 PROCEDURE — 99213 OFFICE O/P EST LOW 20 MIN: CPT | Performed by: INTERNAL MEDICINE

## 2025-06-02 NOTE — ASSESSMENT & PLAN NOTE
Mr. Smith is a 75-year-old male with history significant for polymyalgia rheumatica with onset of symptoms in May 2023 who presents for a follow-up.  He tapered off methotrexate at the end of March 2025.     # Polymyalgia rheumatica  - Gera presents today for a follow-up of polymyalgia rheumatica for which he tapered off methotrexate at the end of March 2025 as he had been feeling well.  With doing so he is not describing any symptoms that would be concerning for activity related to polymyalgia rheumatica and it is reassuring to note that his recent ESR was normal.  In view of this he will remain off treatment and I requested he make me aware if he is to experience any consistent/significant joint pains/swelling/stiffness.  He will update inflammation markers in 3 months to ensure they are stable.    Orders:    C-reactive protein; Future    Sedimentation rate, automated; Future

## 2025-06-02 NOTE — PROGRESS NOTES
Name: El Smith      : 1950      MRN: [id number]  Encounter Provider: Kayley Weldon MD  Encounter Date: 2025   Encounter department: [facility]  :  Assessment & Plan  PMR (polymyalgia rheumatica) (Formerly [facility])  Mr. Smith is a 75-year-old male with history significant for polymyalgia rheumatica with onset of symptoms in May 2023 who presents for a follow-up.  He tapered off methotrexate at the end of 2025.     # Polymyalgia rheumatica  - Gera presents today for a follow-up of polymyalgia rheumatica for which he tapered off methotrexate at the end of 2025 as he had been feeling well.  With doing so he is not describing any symptoms that would be concerning for activity related to polymyalgia rheumatica and it is reassuring to note that his recent ESR was normal.  In view of this he will remain off treatment and I requested he make me aware if he is to experience any consistent/significant joint pains/swelling/stiffness.  He will update inflammation markers in 3 months to ensure they are stable.    Orders:    C-reactive protein; Future    Sedimentation rate, automated; Future    Primary generalized (osteo)arthritis             History of Present Illness   HPI    INITIAL VISIT NOTE (2023):  Mr. Smith is a 73-year-old male with no significant past medical history who presents for an evaluation of polymyalgia rheumatica/joint pains and stiffness.  He is currently on prednisone 15 mg once daily prescribed by his primary care physician.  He is self-referred today.     Patient reports he was in his usual state of health up until the end of April/early May 2023 at which time he was working in the woods and thought he may have had a tick bite.  He did not experience any immediate symptoms or notice a skin rash but reports shortly afterwards he started to experience an aching sensation in his right leg associated with swelling mostly around the knee.  He was  evaluated by his primary care physician and at separate intervals was tested for Lyme disease which were both negative.  Initial labs showed a sed rate of 22 with a C-reactive protein of 21.3.  There was a consideration for polymyalgia rheumatica so he was started on prednisone on 5/25/23 at 20 mg once daily for 2 weeks and then 10 mg once daily for 2 weeks.  He reports within 2 days of taking the prednisone 20 mg once daily he had complete resolution of his symptoms and did well throughout the remaining of the 4-week course.  He mentions after completing the steroid course he started to experience a recurrence of symptoms with pain and stiffness affecting his neck, shoulders, bicep region, hands and back of his thighs.  He did not notice any swollen joints.  Morning stiffness was taking at least 2 hours to improve.  He was again seen by his primary care physician and had labs done which showed an elevated ESR of 60 and C-reactive protein of 53.3.  He was restarted on prednisone at 10 mg once daily for 2 weeks starting on July 4 but he mentions this dose did not help him as much.  The prednisone was then increased to 15 mg once daily which he is still on and reports while it has helped about 70% he is still experiencing some joint pains and stiffness.  He had labs checked on July 24 which showed a downtrending ESR of 28 and C-reactive protein of 9.  He mentions in the past 3 months he has noticed some weight loss of approximately 8 pounds.  He feels his appetite has also decreased.  No fevers, headaches, scalp tenderness, vision changes or jaw claudication.  No history of inflammatory eye disease, psoriasis, inflammatory bowel disease or family history of rheumatic disease.  His brother passed away as a result of primary sclerosing cholangitis.        8/28/2023:  Patient presents for a follow-up of polymyalgia rheumatica.  He is currently on prednisone 15 mg once daily.  I reviewed his recent labs which showed  normal inflammatory markers.  An SPEP showed a faint band visible with overall polyclonal pattern in the gamma region possibly representing an inflammatory or acute phase response, but a developing plasma cell disorder cannot be excluded.  A uric acid level, CK, Sjogren's antibodies and TSH were unremarkable.  X-rays of the bilateral wrists showed advanced degenerative changes.     At the last visit we discussed restarting prednisone at 17.5 mg once daily which he was on for about a month and then decreased the prednisone to 15 mg once daily yesterday.  He mentions he has been doing well on this except for noticing some slight stiffness affecting his fingers.  Other than this no concerning joint pains, swelling or stiffness.     He mentions he does have a wound on his leg and states that healing has been slow because of the steroid use.  It does seem to be improving slowly.        2/6/2025:  Patient presents for a follow-up of polymyalgia rheumatica.  He is currently on methotrexate 4 tablets once weekly.  I reviewed his recent labs which shows an unremarkable CBC, CMP, ESR and CRP.  In the interim he was seen by [facility] and presents to re-establish with [facility].     He mentions he was tapered off the prednisone approximately 1 year ago and started on methotrexate at 20 mg once weekly for a steroid sparing effect.  He has done very well on the methotrexate and is denying any concerning joint pains, swelling or stiffness.  He has been gradually tapering down on the methotrexate and states from 8 tablets once weekly he tapered down to 4 tablets once weekly and will consider decreasing by 1 tablet every 2 weeks.     He has been tolerating the methotrexate well without any concerns for side effects or recurrent infections.      6/2/2025:  Patient presents for a follow-up of polymyalgia rheumatica.  He tapered off the methotrexate at the end of March 2025 and has been doing well.  A recent ESR was normal.    He reports  with gradually tapering off the methotrexate he did not notice any significant change in his symptoms.  He denies any consistent or concerning joint pain, swelling or prolonged morning stiffness of his joints.  He has not had to take any over-the-counter pain medications.  He has an active lifestyle and exercises on the [employer].  He reports with certain activities at the gym he might notice some discomfort or stiffness in his arms or in his thigh region, but nothing that is consistent.  He also describes gelling phenomenon in his legs.          Review of Systems  Constitutional: Negative for weight change, fevers, chills, night sweats, fatigue.  ENT/Mouth: Negative for hearing changes, ear pain, nasal congestion, sinus pain, hoarseness, sore throat, rhinorrhea, swallowing difficulty.   Eyes: Negative for pain, redness, discharge, vision changes.   Cardiovascular: Negative for chest pain, SOB, palpitations.   Respiratory: Negative for cough, sputum, wheezing, dyspnea.   Gastrointestinal: Negative for nausea, vomiting, diarrhea, constipation, pain, heartburn.  Genitourinary: Negative for dysuria, urinary frequency, hematuria.   Musculoskeletal: As per HPI.  Skin: Negative for skin rash, color changes.   Neuro: Negative for weakness, numbness, tingling, loss of consciousness.   Psych: Negative for anxiety, depression.   Heme/Lymph: Negative for easy bruising, bleeding, lymphadenopathy.      Past Medical History   Past Medical History[1]  Past Surgical History[2]  Family History[3]   reports that he quit smoking about 30 years ago. His smoking use included cigarettes. He started smoking about 55 years ago. He has a 12.5 pack-year smoking history. He has quit using smokeless tobacco. He reports that he does not currently use alcohol. He reports that he does not use drugs.  Current Outpatient Medications   Medication Instructions    amLODIPine-benazepril (LOTREL 5-20) 5-20 MG per capsule 1 capsule, Oral, Daily     "Coenzyme Q10 (CoQ-10) 100 MG CAPS     dicyclomine (BENTYL) 20 mg, Oral, Every 12 hours PRN    hydroCHLOROthiazide 12.5 mg, Oral, Daily    Klor-Con M15 15 MEQ TBCR     metoprolol succinate (TOPROL-XL) 25 mg, Oral, Daily    multivitamin (THERAGRAN) TABS 1 tablet, Daily    OMEGA-3 FATTY ACIDS PO 1 capsule, Daily    polyethylene glycol (GOLYTELY) 4000 mL solution 4,000 mL, Oral, Once, Take as directed by office prior to procedure    rosuvastatin (CRESTOR) 5 mg, Oral, 3 times weekly   Allergies[4]   Medications Ordered Prior to Encounter[5]   Social History[6]     Objective   /70 (BP Location: Left arm, Patient Position: Sitting, Cuff Size: Adult)   Pulse 62   Ht 6' 7\" (2.007 m)   Wt 99.2 kg (218 lb 12.8 oz)   SpO2 97%   BMI 24.65 kg/m²      Physical Exam  General: Well appearing, well nourished, in no distress. Oriented x 3, normal mood and affect.  Ambulating without difficulty.  Skin: Good turgor, no rash, unusual bruising or prominent lesions.  Hair: Normal texture and distribution.  HEENT:  Head: Normocephalic, atraumatic.  Eyes: Conjunctiva clear, sclera non-icteric, EOM intact.  Nose: No external lesions.  Neck: Supple.  Extremities: No amputations or deformities, cyanosis, edema.  Musculoskeletal:   There is no peripheral joint soft tissue swelling noted.  Neurologic: Alert and oriented. No focal neurological deficits appreciated.   Psychiatric: Normal mood and affect.            [1]   Past Medical History:  Diagnosis Date    AAA (abdominal aortic aneurysm) (HCC)     Arthritis     wrist and knees    Hypertension     Mixed hyperlipidemia     Nontoxic single thyroid nodule    [2]   Past Surgical History:  Procedure Laterality Date    COLONOSCOPY      KNEE ARTHROSCOPY      therapeutic    VASECTOMY     [3]   Family History  Problem Relation Name Age of Onset    Heart attack Mother          MI    Sudden death Mother          SCD    Stroke Father          CVA    Heart attack Family          acute MI    " Aneurysm Family          AAA    Cancer Family          gastric    Colon cancer Family      Liver cancer Family      Lung cancer Family      Prostate cancer Family      Stroke Family          syndrome   [4]   Allergies  Allergen Reactions    Biaxin [Clarithromycin] Other (See Comments)     Gi intolerance     Sulfa Antibiotics Other (See Comments)     GI intolerance      [5]   Current Outpatient Medications on File Prior to Visit   Medication Sig Dispense Refill    amLODIPine-benazepril (LOTREL 5-20) 5-20 MG per capsule Take 1 capsule by mouth daily 90 capsule 3    Coenzyme Q10 (CoQ-10) 100 MG CAPS       dicyclomine (BENTYL) 20 mg tablet Take 1 tablet (20 mg total) by mouth every 12 (twelve) hours as needed (abdomen pain/spasms) 30 tablet 1    hydroCHLOROthiazide 12.5 mg capsule TAKE 1 CAPSULE DAILY 90 capsule 0    Klor-Con M15 15 MEQ TBCR       metoprolol succinate (TOPROL-XL) 25 mg 24 hr tablet Take 1 tablet (25 mg total) by mouth daily 90 tablet 0    multivitamin (THERAGRAN) TABS Take 1 tablet by mouth in the morning.      OMEGA-3 FATTY ACIDS PO Take 1 capsule by mouth in the morning.      polyethylene glycol (GOLYTELY) 4000 mL solution Take 4,000 mL by mouth once for 1 dose Take as directed by office prior to procedure 4000 mL 0    rosuvastatin (CRESTOR) 5 mg tablet Take 1 tablet (5 mg total) by mouth 3 (three) times a week 36 tablet 0     No current facility-administered medications on file prior to visit.   [6]   Social History  Tobacco Use    Smoking status: Former     Current packs/day: 0.00     Average packs/day: 0.5 packs/day for 25.0 years (12.5 ttl pk-yrs)     Types: Cigarettes     Start date:      Quit date:      Years since quittin.4    Smokeless tobacco: Former   Vaping Use    Vaping status: Never Used   Substance and Sexual Activity    Alcohol use: Not Currently     Comment: social    Drug use: No

## 2025-06-06 DIAGNOSIS — R10.84 GENERALIZED ABDOMINAL PAIN: ICD-10-CM

## 2025-06-06 RX ORDER — DICYCLOMINE HCL 20 MG
TABLET ORAL
Qty: 180 TABLET | Refills: 1 | Status: SHIPPED | OUTPATIENT
Start: 2025-06-06

## 2025-06-09 ENCOUNTER — OFFICE VISIT (OUTPATIENT)
Dept: CARDIOLOGY CLINIC | Facility: MEDICAL CENTER | Age: 75
End: 2025-06-09
Payer: MEDICARE

## 2025-06-09 VITALS
SYSTOLIC BLOOD PRESSURE: 120 MMHG | DIASTOLIC BLOOD PRESSURE: 72 MMHG | HEART RATE: 66 BPM | HEIGHT: 78 IN | OXYGEN SATURATION: 97 % | WEIGHT: 218 LBS | BODY MASS INDEX: 25.22 KG/M2

## 2025-06-09 DIAGNOSIS — I34.1 MVP (MITRAL VALVE PROLAPSE): ICD-10-CM

## 2025-06-09 DIAGNOSIS — I25.10 CORONARY ARTERY DISEASE DUE TO CALCIFIED CORONARY LESION: Primary | ICD-10-CM

## 2025-06-09 DIAGNOSIS — I71.21 ANEURYSM OF ASCENDING AORTA WITHOUT RUPTURE (HCC): ICD-10-CM

## 2025-06-09 DIAGNOSIS — E78.2 MIXED HYPERLIPIDEMIA: ICD-10-CM

## 2025-06-09 DIAGNOSIS — I25.84 CORONARY ARTERY DISEASE DUE TO CALCIFIED CORONARY LESION: Primary | ICD-10-CM

## 2025-06-09 DIAGNOSIS — I10 BENIGN ESSENTIAL HYPERTENSION: ICD-10-CM

## 2025-06-09 PROCEDURE — 99214 OFFICE O/P EST MOD 30 MIN: CPT | Performed by: INTERNAL MEDICINE

## 2025-06-09 NOTE — ASSESSMENT & PLAN NOTE
Calcium score 93, predominantly localized to the left main region.  No anginal symptoms.  Continue statin therapy as tolerated.  Repeat lipids requested by primary team.  Advised to get fasting lipid profile.

## 2025-06-09 NOTE — ASSESSMENT & PLAN NOTE
Echo in 5/23 showed posterior mitral leaflet prolapse.  Follow-up echo 10/24 showed MVP of the posterior leaflet and moderate MR.  Plan repeat echo  11/24.  Findings reviewed with the patient.    Orders:    Echo complete w/ contrast if indicated; Future

## 2025-06-09 NOTE — ASSESSMENT & PLAN NOTE
Repeat lipids have been requested but not yet performed.  Continue rosuvastatin.  Recheck lipids.

## 2025-06-09 NOTE — ASSESSMENT & PLAN NOTE
BP Readings from Last 3 Encounters:   06/09/25 120/72   06/02/25 120/70   05/29/25 164/71   Continue lifestyle modification.   Medical therapy reviewed.  Close blood pressure monitoring.

## 2025-06-09 NOTE — ASSESSMENT & PLAN NOTE
CT of the chest reviewed: Ascending aorta measured at 44 mm.  It was 43 mm back in 4/23.  Fairly unchanged since 2017.  Continue blood pressure control and annual imaging.  Discussed screening for Marfan syndrome.  Helix gene testing requested.    Orders:    CT chest wo contrast; Future

## 2025-06-09 NOTE — PROGRESS NOTES
[facility] [place]  [address]  [address]  [facility] [address]  Phone#  [phone]  Fax#  [phone]  [facility]'s Cardiology Office Follow-up Visit             NAME: El Smith  AGE: 75 y.o. SEX: male   : 1950   MRN: [id number]    DATE: 2025  TIME: 11:50 AM    Cardiology Problem list:  Coronary artery disease: Calcium score 93, calcium localized to left main  Mitral valve prolapse with MR:   : Echo EF 65, PML prolapse, moderate MR  10/24: EF 55, moderate MR, PASP 37  Ascending aortic aneurysm  :  ascending thoracic aorta 43 mm, since .  : Echo ascending aorta 4.1 cm  : Ascending aorta 44 mm  Hypertension with LVH  Dyslipidemia    Assessment & Plan  Coronary artery disease due to calcified coronary lesion    Calcium score 93, predominantly localized to the left main region.  No anginal symptoms.  Continue statin therapy as tolerated.  Repeat lipids requested by primary team.  Advised to get fasting lipid profile.         MVP (mitral valve prolapse)  Echo in  showed posterior mitral leaflet prolapse.  Follow-up echo 10/24 showed MVP of the posterior leaflet and moderate MR.  Plan repeat echo  .  Findings reviewed with the patient.    Orders:    Echo complete w/ contrast if indicated; Future    Mixed hyperlipidemia  Repeat lipids have been requested but not yet performed.  Continue rosuvastatin.  Recheck lipids.         Aneurysm of ascending aorta without rupture (HCC)  CT of the chest reviewed: Ascending aorta measured at 44 mm.  It was 43 mm back in .  Fairly unchanged since .  Continue blood pressure control and annual imaging.  Discussed screening for Marfan syndrome.  Helix gene testing requested.    Orders:    CT chest wo contrast; Future    Benign essential hypertension  BP Readings from Last 3 Encounters:   25 120/72   25 120/70   25 164/71   Continue lifestyle modification.   Medical therapy  reviewed.  Close blood pressure monitoring.                  HPI:    El Smith is a 75 y.o.-year-old male who presents to the cardiology clinic for follow up for the above-listed problems.  He has a history of coronary calcification, mitral prolapse and ascending aortic enlargement.  He also has hypertension and dyslipidemia.  Echo in 10/24: Mitral prolapse seen with moderate MR.  Ascending aorta 4.8 cm on the echo was probably incorrect measurement.  Chest CT showed stable ascending thoracic aortic size at 44 mm..  Images reviewed personally.  Coronary calcification was still noted.  Follow-up will be planned 12/25.  He also has a history of osteoarthritis and polymyalgia rheumatica.  Patient is doing well from a cardiovascular standpoint.    No recent cardiac hospitalizations.    Current medications reviewed.    Reports compliance to medicines.  No side effects reported.  Denies chest pain on exertion.  Denies worsening shortness of breath.  Denies sustained palpitations.    Imaging studies reviewed.      Past history, family history, social history, current medications, vital signs, recent lab and imaging studies and  prior cardiology studies reviewed independently on this visit.    Current Outpatient Medications   Medication Instructions    amLODIPine-benazepril (LOTREL 5-20) 5-20 MG per capsule 1 capsule, Oral, Daily    Coenzyme Q10 (CoQ-10) 100 MG CAPS     dicyclomine (BENTYL) 20 mg tablet TAKE 1 TABLET BY MOUTH EVERY 12 HOURS AS NEEDED (ABDOMEN PAIN/SPASMS)    hydroCHLOROthiazide 12.5 mg, Oral, Daily    Klor-Con M15 15 MEQ TBCR     metoprolol succinate (TOPROL-XL) 25 mg, Oral, Daily    multivitamin (THERAGRAN) TABS 1 tablet, Daily    OMEGA-3 FATTY ACIDS PO 1 capsule, Daily    rosuvastatin (CRESTOR) 5 mg, Oral, 3 times weekly        Review of Systems   Constitutional:  Negative for fatigue.   HENT: Negative.     Eyes: Negative.    Respiratory:  Negative for shortness of breath.    Cardiovascular:   "Negative for chest pain, palpitations and leg swelling.   Gastrointestinal: Negative.    Endocrine: Negative.    Neurological:  Negative for syncope.   Hematological: Negative.    All other systems reviewed and are negative.      Objective:     Vitals:    06/09/25 1133   BP: 120/72   Pulse: 66   SpO2: 97%     Wt Readings from Last 3 Encounters:   06/09/25 98.9 kg (218 lb)   06/02/25 99.2 kg (218 lb 12.8 oz)   05/29/25 98.6 kg (217 lb 6.4 oz)     Pulse Readings from Last 3 Encounters:   06/09/25 66   06/02/25 62   05/29/25 61     BP Readings from Last 3 Encounters:   06/09/25 120/72   06/02/25 120/70   05/29/25 164/71     Physical Exam  Vitals reviewed.   Constitutional:       General: He is not in acute distress.  HENT:      Head: Normocephalic.     Cardiovascular:      Rate and Rhythm: Normal rate and regular rhythm.      Heart sounds: S1 normal and S2 normal. Murmur heard.      Systolic murmur is present with a grade of 4/6.   Pulmonary:      Breath sounds: No wheezing or rales.     Musculoskeletal:         General: No swelling.      Right lower leg: No edema.      Left lower leg: No edema.     Skin:     General: Skin is warm.     Neurological:      Mental Status: He is alert.     Psychiatric:         Mood and Affect: Mood normal.               Imaging Studies:     Pertinent imaging studies and cardiac studies were independently reviewed on this visit and findings summarized.    I have spent a total time of 30  minutes in caring for this patient on the day of the visit/encounter including reviewing and entering of medical history, physical examination, diagnostic results, instructions for management, patient education, risk factor reduction, documenting in the medical record, sending communications to other providers, reviewing/ordering tests, medicine, procedures etc.    Gianni Chaudhary MD, [facility]    Portions of the record may have been created with voice recognition software.  Occasional wrong word or \"sound alike\" " substitutions may have occurred due to the inherent limitations of voice recognition software.  Read the chart carefully and recognize, using context, where substitutions have occurred. Please reach out to me directly for any clarifications.

## 2025-06-18 DIAGNOSIS — I10 HYPERTENSION, UNSPECIFIED TYPE: ICD-10-CM

## 2025-06-18 DIAGNOSIS — E78.2 MIXED HYPERLIPIDEMIA: ICD-10-CM

## 2025-06-18 RX ORDER — METOPROLOL SUCCINATE 25 MG/1
25 TABLET, EXTENDED RELEASE ORAL DAILY
Qty: 90 TABLET | Refills: 1 | Status: SHIPPED | OUTPATIENT
Start: 2025-06-18

## 2025-06-18 RX ORDER — ROSUVASTATIN CALCIUM 5 MG/1
5 TABLET, COATED ORAL 3 TIMES WEEKLY
Qty: 36 TABLET | Refills: 1 | Status: SHIPPED | OUTPATIENT
Start: 2025-06-18

## 2025-07-15 ENCOUNTER — TELEPHONE (OUTPATIENT)
Age: 75
End: 2025-07-15